# Patient Record
Sex: FEMALE | Race: BLACK OR AFRICAN AMERICAN | Employment: PART TIME
[De-identification: names, ages, dates, MRNs, and addresses within clinical notes are randomized per-mention and may not be internally consistent; named-entity substitution may affect disease eponyms.]

---

## 2023-09-25 ENCOUNTER — HOSPITAL ENCOUNTER (OUTPATIENT)
Facility: HOSPITAL | Age: 23
Discharge: HOME | End: 2023-09-28
Payer: MEDICAID

## 2023-09-25 VITALS — WEIGHT: 160 LBS

## 2023-09-25 DIAGNOSIS — H47.11 PAPILLEDEMA ASSOCIATED WITH INCREASED INTRACRANIAL PRESSURE: ICD-10-CM

## 2023-09-25 PROCEDURE — 70553 MRI BRAIN STEM W/O & W/DYE: CPT

## 2023-09-25 PROCEDURE — 70544 MR ANGIOGRAPHY HEAD W/O DYE: CPT

## 2023-09-25 PROCEDURE — A9579 GAD-BASE MR CONTRAST NOS,1ML: HCPCS | Performed by: RADIOLOGY

## 2023-09-25 PROCEDURE — 6360000004 HC RX CONTRAST MEDICATION: Performed by: RADIOLOGY

## 2023-09-25 RX ADMIN — GADOTERIDOL 14 ML: 279.3 INJECTION, SOLUTION INTRAVENOUS at 10:51

## 2024-01-05 VITALS
RESPIRATION RATE: 17 BRPM | HEART RATE: 62 BPM | TEMPERATURE: 97.9 F | WEIGHT: 173.5 LBS | SYSTOLIC BLOOD PRESSURE: 155 MMHG | DIASTOLIC BLOOD PRESSURE: 100 MMHG | OXYGEN SATURATION: 99 %

## 2024-01-05 PROCEDURE — 96372 THER/PROPH/DIAG INJ SC/IM: CPT

## 2024-01-05 PROCEDURE — 99284 EMERGENCY DEPT VISIT MOD MDM: CPT

## 2024-01-06 ENCOUNTER — APPOINTMENT (OUTPATIENT)
Facility: HOSPITAL | Age: 24
End: 2024-01-06
Payer: MEDICAID

## 2024-01-06 ENCOUNTER — HOSPITAL ENCOUNTER (EMERGENCY)
Facility: HOSPITAL | Age: 24
Discharge: HOME | End: 2024-01-06
Payer: MEDICAID

## 2024-01-06 DIAGNOSIS — R51.9 NONINTRACTABLE HEADACHE, UNSPECIFIED CHRONICITY PATTERN, UNSPECIFIED HEADACHE TYPE: Primary | ICD-10-CM

## 2024-01-06 LAB
BASOPHILS # BLD: 0.1 K/UL (ref 0–0.1)
BASOPHILS NFR BLD: 1 % (ref 0–1)
DIFFERENTIAL METHOD BLD: ABNORMAL
EOSINOPHIL # BLD: 0.1 K/UL (ref 0–0.4)
EOSINOPHIL NFR BLD: 2 % (ref 0–7)
ERYTHROCYTE [DISTWIDTH] IN BLOOD BY AUTOMATED COUNT: 12.8 % (ref 11.5–14.5)
HCG UR QL: NEGATIVE
HCT VFR BLD AUTO: 35.7 % (ref 35–47)
HGB BLD-MCNC: 11.7 G/DL (ref 11.5–16)
IMM GRANULOCYTES # BLD AUTO: 0 K/UL (ref 0–0.04)
IMM GRANULOCYTES NFR BLD AUTO: 0 % (ref 0–0.5)
LYMPHOCYTES # BLD: 3.3 K/UL (ref 0.8–3.5)
LYMPHOCYTES NFR BLD: 47 % (ref 12–49)
MCH RBC QN AUTO: 32.1 PG (ref 26–34)
MCHC RBC AUTO-ENTMCNC: 32.8 G/DL (ref 30–36.5)
MCV RBC AUTO: 98.1 FL (ref 80–99)
MONOCYTES # BLD: 0.5 K/UL (ref 0–1)
MONOCYTES NFR BLD: 7 % (ref 5–13)
NEUTS SEG # BLD: 3.1 K/UL (ref 1.8–8)
NEUTS SEG NFR BLD: 43 % (ref 32–75)
NRBC # BLD: 0 K/UL (ref 0–0.01)
NRBC BLD-RTO: 0 PER 100 WBC
PLATELET # BLD AUTO: 517 K/UL (ref 150–400)
PMV BLD AUTO: 9.1 FL (ref 8.9–12.9)
RBC # BLD AUTO: 3.64 M/UL (ref 3.8–5.2)
WBC # BLD AUTO: 7.1 K/UL (ref 3.6–11)

## 2024-01-06 PROCEDURE — 36415 COLL VENOUS BLD VENIPUNCTURE: CPT

## 2024-01-06 PROCEDURE — 6370000000 HC RX 637 (ALT 250 FOR IP): Performed by: STUDENT IN AN ORGANIZED HEALTH CARE EDUCATION/TRAINING PROGRAM

## 2024-01-06 PROCEDURE — 85025 COMPLETE CBC W/AUTO DIFF WBC: CPT

## 2024-01-06 PROCEDURE — 96372 THER/PROPH/DIAG INJ SC/IM: CPT

## 2024-01-06 PROCEDURE — 6360000002 HC RX W HCPCS: Performed by: STUDENT IN AN ORGANIZED HEALTH CARE EDUCATION/TRAINING PROGRAM

## 2024-01-06 PROCEDURE — 81025 URINE PREGNANCY TEST: CPT

## 2024-01-06 PROCEDURE — 70450 CT HEAD/BRAIN W/O DYE: CPT

## 2024-01-06 PROCEDURE — 93005 ELECTROCARDIOGRAM TRACING: CPT | Performed by: STUDENT IN AN ORGANIZED HEALTH CARE EDUCATION/TRAINING PROGRAM

## 2024-01-06 RX ORDER — ONDANSETRON 4 MG/1
4 TABLET, ORALLY DISINTEGRATING ORAL ONCE
Status: COMPLETED | OUTPATIENT
Start: 2024-01-06 | End: 2024-01-06

## 2024-01-06 RX ORDER — ACETAMINOPHEN 500 MG
1000 TABLET ORAL
Status: COMPLETED | OUTPATIENT
Start: 2024-01-06 | End: 2024-01-06

## 2024-01-06 RX ORDER — KETOROLAC TROMETHAMINE 30 MG/ML
15 INJECTION, SOLUTION INTRAMUSCULAR; INTRAVENOUS
Status: COMPLETED | OUTPATIENT
Start: 2024-01-06 | End: 2024-01-06

## 2024-01-06 RX ORDER — 0.9 % SODIUM CHLORIDE 0.9 %
1000 INTRAVENOUS SOLUTION INTRAVENOUS ONCE
Status: DISCONTINUED | OUTPATIENT
Start: 2024-01-06 | End: 2024-01-06

## 2024-01-06 RX ORDER — KETOROLAC TROMETHAMINE 30 MG/ML
15 INJECTION, SOLUTION INTRAMUSCULAR; INTRAVENOUS
Status: DISCONTINUED | OUTPATIENT
Start: 2024-01-06 | End: 2024-01-06

## 2024-01-06 RX ORDER — ONDANSETRON 2 MG/ML
4 INJECTION INTRAMUSCULAR; INTRAVENOUS ONCE
Status: DISCONTINUED | OUTPATIENT
Start: 2024-01-06 | End: 2024-01-06

## 2024-01-06 RX ADMIN — ONDANSETRON 4 MG: 4 TABLET, ORALLY DISINTEGRATING ORAL at 02:54

## 2024-01-06 RX ADMIN — ACETAMINOPHEN 1000 MG: 500 TABLET ORAL at 02:42

## 2024-01-06 RX ADMIN — KETOROLAC TROMETHAMINE 15 MG: 30 INJECTION, SOLUTION INTRAMUSCULAR; INTRAVENOUS at 02:55

## 2024-01-06 ASSESSMENT — PAIN DESCRIPTION - LOCATION: LOCATION: HEAD

## 2024-01-06 ASSESSMENT — PAIN SCALES - GENERAL
PAINLEVEL_OUTOF10: 7

## 2024-01-06 ASSESSMENT — PAIN DESCRIPTION - FREQUENCY: FREQUENCY: CONTINUOUS

## 2024-01-06 ASSESSMENT — PAIN DESCRIPTION - DESCRIPTORS: DESCRIPTORS: SHARP

## 2024-01-06 NOTE — ED NOTES
Incorrect triage story and no vitals entered by previous RN.  I called patient into triage room at this time to record correct story and vital signs.

## 2024-01-06 NOTE — ED PROVIDER NOTES
unspecified chronicity pattern, unspecified headache type          DISPOSITION/PLAN   DISPOSITION Decision To Discharge 01/06/2024 03:44:30 AM      Discharge Note: The patient is stable for discharge home. The signs, symptoms, diagnosis, and discharge instructions have been discussed, understanding conveyed, and agreed upon. The patient is to follow up as recommended or return to ER should their symptoms worsen.      PATIENT REFERRED TO:  [facility] EMERGENCY DEPT  [address] [facility] [address] [phone]  Go to   As needed, If symptoms worsen    Lulu Stone PA  [address] [facility] [address] [phone]    Go to   As needed, If symptoms worsen         DISCHARGE MEDICATIONS:     Medication List      You have not been prescribed any medications.           DISCONTINUED MEDICATIONS:  There are no discharge medications for this patient.      I am the Primary Clinician of Record.   Snady Meza MD (electronically signed)      (Please note that parts of this dictation were completed with voice recognition software. Quite often unanticipated grammatical, syntax, homophones, and other interpretive errors are inadvertently transcribed by the computer software. Please disregards these errors. Please excuse any errors that have escaped final proofreading.)

## 2024-01-06 NOTE — DISCHARGE INSTRUCTIONS
You were seen in the emergency department for headache.  We performed a CT scan of your head which was normal.  You were given medications for pain.  You may take 4000 mg, or 4 g, of Tylenol daily-this is 2 tablets of the 500 mg dose 4 times daily.  You may also take ibuprofen.  Please follow-up with your scheduled neurology appointment.  Please return to the emergency department if you develop any difficulty seeing, weakness on one side of your body, slurred speech, worsening headache that does not go away with Tylenol or Motrin, headache with fever, headache with vomiting and unable to keep down liquids, or for any other symptoms that are concerning to you.

## 2024-01-07 LAB
EKG ATRIAL RATE: 80 BPM
EKG DIAGNOSIS: NORMAL
EKG P AXIS: 60 DEGREES
EKG P-R INTERVAL: 144 MS
EKG Q-T INTERVAL: 394 MS
EKG QRS DURATION: 72 MS
EKG QTC CALCULATION (BAZETT): 454 MS
EKG R AXIS: 74 DEGREES
EKG T AXIS: 37 DEGREES
EKG VENTRICULAR RATE: 80 BPM

## 2024-01-19 ENCOUNTER — ANCILLARY ORDERS (OUTPATIENT)
Age: 24
End: 2024-01-19

## 2024-01-19 ENCOUNTER — TELEPHONE (OUTPATIENT)
Age: 24
End: 2024-01-19

## 2024-01-19 ENCOUNTER — OFFICE VISIT (OUTPATIENT)
Age: 24
End: 2024-01-19
Payer: MEDICAID

## 2024-01-19 VITALS
OXYGEN SATURATION: 99 % | SYSTOLIC BLOOD PRESSURE: 122 MMHG | HEIGHT: 60 IN | WEIGHT: 174 LBS | BODY MASS INDEX: 34.16 KG/M2 | RESPIRATION RATE: 16 BRPM | DIASTOLIC BLOOD PRESSURE: 72 MMHG | HEART RATE: 90 BPM

## 2024-01-19 DIAGNOSIS — G93.2 IIH (IDIOPATHIC INTRACRANIAL HYPERTENSION): Primary | ICD-10-CM

## 2024-01-19 DIAGNOSIS — G93.2 IIH (IDIOPATHIC INTRACRANIAL HYPERTENSION): ICD-10-CM

## 2024-01-19 PROCEDURE — 99204 OFFICE O/P NEW MOD 45 MIN: CPT | Performed by: PSYCHIATRY & NEUROLOGY

## 2024-01-19 RX ORDER — TOPIRAMATE 50 MG/1
TABLET, FILM COATED ORAL
Qty: 60 TABLET | Refills: 1 | Status: SHIPPED | OUTPATIENT
Start: 2024-01-19

## 2024-01-19 RX ORDER — IBUPROFEN 800 MG/1
TABLET ORAL
COMMUNITY
Start: 2024-01-08

## 2024-01-19 ASSESSMENT — PATIENT HEALTH QUESTIONNAIRE - PHQ9
SUM OF ALL RESPONSES TO PHQ QUESTIONS 1-9: 1
SUM OF ALL RESPONSES TO PHQ QUESTIONS 1-9: 1
2. FEELING DOWN, DEPRESSED OR HOPELESS: 1
SUM OF ALL RESPONSES TO PHQ QUESTIONS 1-9: 1
SUM OF ALL RESPONSES TO PHQ9 QUESTIONS 1 & 2: 1
SUM OF ALL RESPONSES TO PHQ QUESTIONS 1-9: 1
1. LITTLE INTEREST OR PLEASURE IN DOING THINGS: 0

## 2024-01-19 NOTE — PROGRESS NOTES
fasiculations noted.      Reflexes:  DTRs 2+ throughout.  Toes downgoing.        LABS / IMAGING  MRI Result (most recent):  MRI BRAIN W WO CONTRAST 09/25/2023    Narrative  EXAM:  MRV HEAD WO CONTRAST, MRI BRAIN W WO CONTRAST    INDICATION: 23-year-old female with Papilledema associated with increased  intracranial pressure.    COMPARISON:  None.    CONTRAST:  14 mL of ProHance.    TECHNIQUE:  Multiplanar multisequence acquisition of the brain with and without IV contrast  administration. Additional sequences with particular focus on the orbits were  also acquired.  3-D time-of-flight noncontrast MRV of the brain was performed without IV  contrast administration. Multiplanar and MIP reconstructions were obtained.      FINDINGS:  MRI  No apparent parenchyma signal abnormality.  There is no acute infarct,  hemorrhage, extra-axial fluid collection, or mass effect. The ventricles are  normal in size and position.  There is no cerebellar tonsillar herniation.  Expected arterial flow-voids are present. Basal CSF cisterns are patent.  No  apparent mass or enhancing lesion.    Bilateral intraorbital optic nerves perineural sheath fluid distention with  associated flattening of the optic cups, compatible with reported papilledema.  No associated mass/intraorbital or enhancing lesion. The superior ophthalmic  veins are patent and grossly normal bilaterally. Intracanalicular and cisterna  segments and optic chiasm are grossly within normal limits. No sellar/parasellar  mass lesion. Pituitary gland configuration is within normal limits. Cavernous  sinuses within normal limits bilaterally.    Mild diffuse mucosal thickening of the paranasal sinuses. The mastoids are free  of fluid bilaterally.  No significant osseous or scalp lesions are identified.      MRV:  There is no evidence of dural venous sinus occlusion. At least moderate to  severe stenoses of proximal bilateral transverse sinuses, indeterminate.    No apparent

## 2024-01-19 NOTE — TELEPHONE ENCOUNTER
Called central scheduling to confirm the spinal puncture order. PSR called radiology and stated the order should be under \"FL Lumbar Puncture.\" Will reorder.

## 2024-02-05 ENCOUNTER — TELEPHONE (OUTPATIENT)
Age: 24
End: 2024-02-05

## 2024-02-05 ENCOUNTER — HOSPITAL ENCOUNTER (OUTPATIENT)
Facility: HOSPITAL | Age: 24
Discharge: HOME | End: 2024-02-08
Attending: PSYCHIATRY & NEUROLOGY
Payer: MEDICAID

## 2024-02-05 DIAGNOSIS — G93.2 IIH (IDIOPATHIC INTRACRANIAL HYPERTENSION): ICD-10-CM

## 2024-02-05 LAB — HCG UR QL: POSITIVE

## 2024-02-05 PROCEDURE — 81025 URINE PREGNANCY TEST: CPT

## 2024-02-05 NOTE — PROGRESS NOTES
Pt stated she \"has not had a period this month\". Routine POC urine pregnancy was run by Amy Sneed RN. POC test was POSITIVE. Verified by 2 RNs.  Provider was notified.  Ira Au NP. This RN notified pt and pt stated \"I knew it was possible\". Provider cancelled case and pt will be seen by OB/GYN and neurology for updated plan of care. Pt verbalized understanding. Pt ambulated off unit with steady gait noted.

## 2024-02-05 NOTE — RESULT ENCOUNTER NOTE
Yaya Lake MD Javed, Jihan I, MILI  Please inform patient that since her pregnancy test is positive.  She should discontinue topiramate, if she has not already done so.     Patient called, verified and notified. Stated her last dose of Topirmate \" was a week ago.\"

## 2024-02-05 NOTE — TELEPHONE ENCOUNTER
Patient called, verified and given positive pregnancy test results. She states \" I cannot take the medicine and they did not let me do the lumbar puncture today so what do I do?\"

## 2024-02-06 NOTE — TELEPHONE ENCOUNTER
Patient would like a call back from the nurse. Patient states she missed a call from the nurse today.

## 2024-02-07 ENCOUNTER — HOSPITAL ENCOUNTER (EMERGENCY)
Facility: HOSPITAL | Age: 24
Discharge: HOME | End: 2024-02-07
Attending: STUDENT IN AN ORGANIZED HEALTH CARE EDUCATION/TRAINING PROGRAM
Payer: MEDICAID

## 2024-02-07 ENCOUNTER — APPOINTMENT (OUTPATIENT)
Facility: HOSPITAL | Age: 24
End: 2024-02-07
Payer: MEDICAID

## 2024-02-07 VITALS
WEIGHT: 180.12 LBS | OXYGEN SATURATION: 100 % | SYSTOLIC BLOOD PRESSURE: 108 MMHG | RESPIRATION RATE: 18 BRPM | DIASTOLIC BLOOD PRESSURE: 68 MMHG | BODY MASS INDEX: 35.18 KG/M2 | HEART RATE: 62 BPM | TEMPERATURE: 98.2 F

## 2024-02-07 DIAGNOSIS — N30.01 ACUTE CYSTITIS WITH HEMATURIA: ICD-10-CM

## 2024-02-07 DIAGNOSIS — O46.90 VAGINAL BLEEDING IN PREGNANCY: Primary | ICD-10-CM

## 2024-02-07 LAB
ABO + RH BLD: NORMAL
ALBUMIN SERPL-MCNC: 3.7 G/DL (ref 3.5–5)
ALBUMIN/GLOB SERPL: 1 (ref 1.1–2.2)
ALP SERPL-CCNC: 48 U/L (ref 45–117)
ALT SERPL-CCNC: 16 U/L (ref 12–78)
ANION GAP SERPL CALC-SCNC: 3 MMOL/L (ref 5–15)
APPEARANCE UR: ABNORMAL
AST SERPL-CCNC: 12 U/L (ref 15–37)
BACTERIA URNS QL MICRO: ABNORMAL /HPF
BASOPHILS # BLD: 0.1 K/UL (ref 0–0.1)
BASOPHILS NFR BLD: 1 % (ref 0–1)
BILIRUB SERPL-MCNC: 0.3 MG/DL (ref 0.2–1)
BILIRUB UR QL: NEGATIVE
BLOOD BANK CMNT PATIENT-IMP: NORMAL
BUN SERPL-MCNC: 9 MG/DL (ref 6–20)
BUN/CREAT SERPL: 12 (ref 12–20)
CALCIUM SERPL-MCNC: 9 MG/DL (ref 8.5–10.1)
CHLORIDE SERPL-SCNC: 110 MMOL/L (ref 97–108)
CO2 SERPL-SCNC: 26 MMOL/L (ref 21–32)
COLOR UR: ABNORMAL
COMMENT:: NORMAL
CREAT SERPL-MCNC: 0.76 MG/DL (ref 0.55–1.02)
DIFFERENTIAL METHOD BLD: ABNORMAL
EOSINOPHIL # BLD: 0 K/UL (ref 0–0.4)
EOSINOPHIL NFR BLD: 1 % (ref 0–7)
EPITH CASTS URNS QL MICRO: ABNORMAL /LPF
ERYTHROCYTE [DISTWIDTH] IN BLOOD BY AUTOMATED COUNT: 13 % (ref 11.5–14.5)
GLOBULIN SER CALC-MCNC: 3.8 G/DL (ref 2–4)
GLUCOSE SERPL-MCNC: 76 MG/DL (ref 65–100)
GLUCOSE UR STRIP.AUTO-MCNC: NEGATIVE MG/DL
HCG SERPL-ACNC: 2923 MIU/ML (ref 0–6)
HCT VFR BLD AUTO: 36.8 % (ref 35–47)
HGB BLD-MCNC: 11.9 G/DL (ref 11.5–16)
HGB UR QL STRIP: NEGATIVE
HYALINE CASTS URNS QL MICRO: ABNORMAL /LPF (ref 0–5)
IMM GRANULOCYTES # BLD AUTO: 0 K/UL (ref 0–0.04)
IMM GRANULOCYTES NFR BLD AUTO: 0 % (ref 0–0.5)
KETONES UR QL STRIP.AUTO: NEGATIVE MG/DL
LEUKOCYTE ESTERASE UR QL STRIP.AUTO: ABNORMAL
LYMPHOCYTES # BLD: 3 K/UL (ref 0.8–3.5)
LYMPHOCYTES NFR BLD: 34 % (ref 12–49)
MCH RBC QN AUTO: 31.7 PG (ref 26–34)
MCHC RBC AUTO-ENTMCNC: 32.3 G/DL (ref 30–36.5)
MCV RBC AUTO: 98.1 FL (ref 80–99)
MONOCYTES # BLD: 0.6 K/UL (ref 0–1)
MONOCYTES NFR BLD: 7 % (ref 5–13)
NEUTS SEG # BLD: 5.2 K/UL (ref 1.8–8)
NEUTS SEG NFR BLD: 57 % (ref 32–75)
NITRITE UR QL STRIP.AUTO: NEGATIVE
NRBC # BLD: 0 K/UL (ref 0–0.01)
NRBC BLD-RTO: 0 PER 100 WBC
PH UR STRIP: 7.5 (ref 5–8)
PLATELET # BLD AUTO: 442 K/UL (ref 150–400)
PMV BLD AUTO: 8.9 FL (ref 8.9–12.9)
POTASSIUM SERPL-SCNC: 3.8 MMOL/L (ref 3.5–5.1)
PROT SERPL-MCNC: 7.5 G/DL (ref 6.4–8.2)
PROT UR STRIP-MCNC: NEGATIVE MG/DL
RBC # BLD AUTO: 3.75 M/UL (ref 3.8–5.2)
RBC #/AREA URNS HPF: ABNORMAL /HPF (ref 0–5)
SAC DIAMETER: 0.44 CM
SAC DIAMETER: 0.44 CM
SAC DIAMETER: 0.45 CM
SODIUM SERPL-SCNC: 139 MMOL/L (ref 136–145)
SP GR UR REFRACTOMETRY: 1.03 (ref 1–1.03)
SPECIMEN HOLD: NORMAL
SPECIMEN HOLD: NORMAL
UROBILINOGEN UR QL STRIP.AUTO: 1 EU/DL (ref 0.2–1)
WBC # BLD AUTO: 8.8 K/UL (ref 3.6–11)
WBC URNS QL MICRO: ABNORMAL /HPF (ref 0–4)

## 2024-02-07 PROCEDURE — 86901 BLOOD TYPING SEROLOGIC RH(D): CPT

## 2024-02-07 PROCEDURE — 85025 COMPLETE CBC W/AUTO DIFF WBC: CPT

## 2024-02-07 PROCEDURE — 99284 EMERGENCY DEPT VISIT MOD MDM: CPT

## 2024-02-07 PROCEDURE — 36415 COLL VENOUS BLD VENIPUNCTURE: CPT

## 2024-02-07 PROCEDURE — 76817 TRANSVAGINAL US OBSTETRIC: CPT

## 2024-02-07 PROCEDURE — 81001 URINALYSIS AUTO W/SCOPE: CPT

## 2024-02-07 PROCEDURE — 84702 CHORIONIC GONADOTROPIN TEST: CPT

## 2024-02-07 PROCEDURE — 86900 BLOOD TYPING SEROLOGIC ABO: CPT

## 2024-02-07 PROCEDURE — 76801 OB US < 14 WKS SINGLE FETUS: CPT

## 2024-02-07 PROCEDURE — 80053 COMPREHEN METABOLIC PANEL: CPT

## 2024-02-07 RX ORDER — CEPHALEXIN 500 MG/1
500 CAPSULE ORAL 2 TIMES DAILY
Qty: 14 CAPSULE | Refills: 0 | Status: SHIPPED | OUTPATIENT
Start: 2024-02-07 | End: 2024-02-14

## 2024-02-07 ASSESSMENT — ENCOUNTER SYMPTOMS
COUGH: 0
EYE PAIN: 0
NAUSEA: 0
DIARRHEA: 0
SORE THROAT: 0
ABDOMINAL PAIN: 0
VOMITING: 0
SHORTNESS OF BREATH: 0

## 2024-02-07 ASSESSMENT — PAIN SCALES - GENERAL: PAINLEVEL_OUTOF10: 0

## 2024-02-07 NOTE — ED PROVIDER NOTES
Platelets 442 (*)     All other components within normal limits   COMPREHENSIVE METABOLIC PANEL - Abnormal; Notable for the following components:    Chloride 110 (*)     Anion Gap 3 (*)     AST 12 (*)     Albumin/Globulin Ratio 1.0 (*)     All other components within normal limits   HCG, QUANTITATIVE, PREGNANCY - Abnormal; Notable for the following components:    hCG Quant 2,923 (*)     All other components within normal limits   URINE CULTURE HOLD SAMPLE   EXTRA TUBES HOLD   EXTRA TUBE, BLOOD BANK   ABO/RH       All other labs were within normal range or not returned as of this dictation.    EMERGENCY DEPARTMENT COURSE and DIFFERENTIAL DIAGNOSIS/MDM:   Vitals:    Vitals:    24 1133 24 1415   BP: (!) 143/78 108/68   Pulse: 91 62   Resp: 16 18   Temp: 98.5 °F (36.9 °C) 98.2 °F (36.8 °C)   TempSrc: Oral Oral   SpO2: 100% 100%   Weight: 81.7 kg (180 lb 1.9 oz)            Medical Decision Making  23 y.o.  female presents to ED with vaginal bleeding, pelvic pain and leg swelling.  Vital signs stable in triage and patient afebrile.  Physical exam unremarkable with heart sounds normal, lungs clear to auscultation bilaterally.  No abdominal tenderness to palpation and no leg swelling per my personal physical exam.  Labs unremarkable with beta-hCG around 2900 but no elevation white blood cell count no electrolyte abnormalities.  Did note slight indication of urinary tract infection with leukocyte Estrace and bacteria in urine.  Will treat as such.  Ultrasound findings as outlined below. Results and findings have been communicated and explained thoroughly to patient and family members. Patient has been educated on strict return precautions as well as instructions for conservative care and follow-up. Patient will be discharged with prescription for keflex and has been given opportunity to ask questions about this new medication.Patient verbalizes understanding and no socioeconomic barriers to care were

## 2024-02-07 NOTE — DISCHARGE INSTRUCTIONS
Continue to monitor symptoms at home.  Take Tylenol as needed for pain.  Follow-up with PCP and return with any changes or worsening.

## 2024-02-07 NOTE — ED TRIAGE NOTES
Pt c/o bilateral leg swelling, pt stated she is pregnant and is having vaginal bleeding that started today , LMP Feb, denies cp or sob

## 2024-02-07 NOTE — TELEPHONE ENCOUNTER
M for patient to call back and discuss Dr. Lake's recommendations.     \"She does need a lumbar puncture for diagnostic and therapeutic purposes.  If headaches have not remitting, I would recommend going to the ER to get an LP.  I would avoid medications at least for the first few weeks of the first trimester.\"    Yaya Lake MD.

## 2024-02-17 ENCOUNTER — HOSPITAL ENCOUNTER (EMERGENCY)
Facility: HOSPITAL | Age: 24
Discharge: HOME | End: 2024-02-17
Attending: STUDENT IN AN ORGANIZED HEALTH CARE EDUCATION/TRAINING PROGRAM
Payer: MEDICAID

## 2024-02-17 VITALS
TEMPERATURE: 98 F | WEIGHT: 196.65 LBS | HEART RATE: 75 BPM | HEIGHT: 60 IN | OXYGEN SATURATION: 98 % | RESPIRATION RATE: 12 BRPM | SYSTOLIC BLOOD PRESSURE: 119 MMHG | DIASTOLIC BLOOD PRESSURE: 76 MMHG | BODY MASS INDEX: 38.61 KG/M2

## 2024-02-17 DIAGNOSIS — B37.31 YEAST VAGINITIS: Primary | ICD-10-CM

## 2024-02-17 LAB
APPEARANCE UR: CLEAR
BACTERIA URNS QL MICRO: ABNORMAL /HPF
BILIRUB UR QL: NEGATIVE
COLOR UR: ABNORMAL
COMMENT:: NORMAL
EPITH CASTS URNS QL MICRO: ABNORMAL /LPF
GLUCOSE UR STRIP.AUTO-MCNC: NEGATIVE MG/DL
HCG UR QL: POSITIVE
HGB UR QL STRIP: NEGATIVE
KETONES UR QL STRIP.AUTO: NEGATIVE MG/DL
LEUKOCYTE ESTERASE UR QL STRIP.AUTO: NEGATIVE
NITRITE UR QL STRIP.AUTO: NEGATIVE
PH UR STRIP: 6 (ref 5–8)
PROT UR STRIP-MCNC: NEGATIVE MG/DL
RBC #/AREA URNS HPF: ABNORMAL /HPF (ref 0–5)
SP GR UR REFRACTOMETRY: 1.02 (ref 1–1.03)
SPECIMEN HOLD: NORMAL
UROBILINOGEN UR QL STRIP.AUTO: 0.2 EU/DL (ref 0.2–1)
WBC URNS QL MICRO: ABNORMAL /HPF (ref 0–4)

## 2024-02-17 PROCEDURE — 99283 EMERGENCY DEPT VISIT LOW MDM: CPT

## 2024-02-17 PROCEDURE — 81001 URINALYSIS AUTO W/SCOPE: CPT

## 2024-02-17 PROCEDURE — 81025 URINE PREGNANCY TEST: CPT

## 2024-02-17 ASSESSMENT — PAIN - FUNCTIONAL ASSESSMENT: PAIN_FUNCTIONAL_ASSESSMENT: 0-10

## 2024-02-17 ASSESSMENT — PAIN DESCRIPTION - LOCATION: LOCATION: VAGINA

## 2024-02-17 ASSESSMENT — PAIN SCALES - GENERAL: PAINLEVEL_OUTOF10: 5

## 2024-02-17 ASSESSMENT — PAIN DESCRIPTION - DESCRIPTORS: DESCRIPTORS: BURNING

## 2024-02-17 NOTE — ED TRIAGE NOTES
Pt arrives from home for concern of yeast infection. Pt is pregnant, does not know how far along she is. Also c/o blood from rectum.

## 2024-02-17 NOTE — ED PROVIDER NOTES
[facility] EMERGENCY DEP  EMERGENCY DEPARTMENT ENCOUNTER      Pt Name: Ina Connor  MRN: [id number]  Birthdate 2000  Date of evaluation: 2024  Provider: MONTY Magallanes NP    CHIEF COMPLAINT       Chief Complaint   Patient presents with    Vaginal Itching         HISTORY OF PRESENT ILLNESS   (Location/Symptom, Timing/Onset, Context/Setting, Quality, Duration, Modifying Factors, Severity)  Note limiting factors.   HPI  Patient is a 23-year-old pregnant female who presents to the ED with vaginal area itching and concern for yeast infection.(A0). LMP 2024.  She denies any fever, abdominal pain, pelvic pain or back pain.  Denies any nausea vomiting, urinary symptoms constipation or diarrhea.  She has not had any medications today prior to arrival.  Old chart reviewed.    Review of External Medical Records:     Nursing Notes were reviewed.    REVIEW OF SYSTEMS    (2-9 systems for level 4, 10 or more for level 5)     Review of Systems   Constitutional:  Negative for activity change, appetite change, fever and unexpected weight change.   HENT:  Negative for congestion and trouble swallowing.    Eyes:  Negative for visual disturbance.   Respiratory:  Negative for cough and shortness of breath.    Cardiovascular:  Negative for chest pain, palpitations and leg swelling.   Gastrointestinal:  Negative for abdominal pain, diarrhea, nausea and vomiting.   Genitourinary:  Negative for dysuria, pelvic pain, vaginal bleeding and vaginal discharge.   Musculoskeletal:  Negative for back pain, gait problem and myalgias.   Skin:  Negative for rash.   Neurological:  Negative for headaches.   All other systems reviewed and are negative.      Except as noted above the remainder of the review of systems was reviewed and negative.       PAST MEDICAL HISTORY   No past medical history on file.      SURGICAL HISTORY     No past surgical history on file.      CURRENT MEDICATIONS       Discharge Medication List as of  agreement of her signs, symptoms, diagnosis, treatment and prognosis and additionally agrees to follow up as recommended or return to the Emergency Room should her condition change prior to follow-up.  Discharge instructions have also been provided to the patient with some educational information regarding her diagnosis as well a list of reasons why she would want to return to the ER prior to her follow-up appointment should her condition change.        CONSULTS:  None    PROCEDURES:  Unless otherwise noted below, none     Procedures      FINAL IMPRESSION      1. Yeast vaginitis          DISPOSITION/PLAN   DISPOSITION Decision To Discharge 02/17/2024 03:12:49 PM      PATIENT REFERRED TO:  Raphael Gill MD  [address] [facility]  [address]  [facility] [address] [phone]    In 3 days  As needed, If symptoms worsen    Lulu Stone PA  [address] [facility] [address] [phone]      As needed, If symptoms worsen      DISCHARGE MEDICATIONS:  Discharge Medication List as of 2/17/2024  3:22 PM        START taking these medications    Details   miconazole (MICONAZOLE 7) 2 % vaginal cream Place vaginally nightly., Disp-45 g, R-1Normal               (Please note that portions of this note were completed with a voice recognition program.  Efforts were made to edit the dictations but occasionally words are mis-transcribed.)    MONTY Magallanes NP (electronically signed)  Emergency Attending Physician / Physician Assistant / Nurse Practitioner             Jannet He APRN - NP  02/17/24 3146

## 2024-02-26 ENCOUNTER — HOSPITAL ENCOUNTER (EMERGENCY)
Facility: HOSPITAL | Age: 24
Discharge: HOME | End: 2024-02-27

## 2024-02-26 VITALS
HEART RATE: 75 BPM | RESPIRATION RATE: 17 BRPM | TEMPERATURE: 98 F | BODY MASS INDEX: 35.95 KG/M2 | SYSTOLIC BLOOD PRESSURE: 124 MMHG | OXYGEN SATURATION: 99 % | DIASTOLIC BLOOD PRESSURE: 81 MMHG | WEIGHT: 184.08 LBS

## 2024-02-26 ASSESSMENT — PAIN SCALES - GENERAL: PAINLEVEL_OUTOF10: 10

## 2024-02-26 ASSESSMENT — PAIN - FUNCTIONAL ASSESSMENT: PAIN_FUNCTIONAL_ASSESSMENT: 0-10

## 2024-02-26 ASSESSMENT — PAIN DESCRIPTION - LOCATION: LOCATION: HEAD

## 2024-02-27 NOTE — ED TRIAGE NOTES
Pt comes in from home with CC of a bad migraine that has been going on for a few nights, but got worse tonight. Pt is 8 weeks pregnant. Pt saw OB today.     HX of ICP.

## 2024-02-29 ENCOUNTER — HOSPITAL ENCOUNTER (EMERGENCY)
Facility: HOSPITAL | Age: 24
Discharge: HOME | End: 2024-02-29
Attending: STUDENT IN AN ORGANIZED HEALTH CARE EDUCATION/TRAINING PROGRAM
Payer: MEDICAID

## 2024-02-29 VITALS
HEART RATE: 71 BPM | DIASTOLIC BLOOD PRESSURE: 88 MMHG | SYSTOLIC BLOOD PRESSURE: 131 MMHG | RESPIRATION RATE: 18 BRPM | TEMPERATURE: 97.7 F | OXYGEN SATURATION: 100 %

## 2024-02-29 DIAGNOSIS — R51.9 CHRONIC NONINTRACTABLE HEADACHE, UNSPECIFIED HEADACHE TYPE: Primary | ICD-10-CM

## 2024-02-29 DIAGNOSIS — G89.29 CHRONIC NONINTRACTABLE HEADACHE, UNSPECIFIED HEADACHE TYPE: Primary | ICD-10-CM

## 2024-02-29 LAB
ANION GAP SERPL CALC-SCNC: 2 MMOL/L (ref 5–15)
BASOPHILS # BLD: 0.1 K/UL (ref 0–0.1)
BASOPHILS NFR BLD: 1 % (ref 0–1)
BUN SERPL-MCNC: 7 MG/DL (ref 6–20)
BUN/CREAT SERPL: 10 (ref 12–20)
CHLORIDE SERPL-SCNC: 107 MMOL/L (ref 97–108)
CO2 SERPL-SCNC: 24 MMOL/L (ref 21–32)
COMMENT:: NORMAL
CREAT SERPL-MCNC: 0.72 MG/DL (ref 0.55–1.02)
DIFFERENTIAL METHOD BLD: ABNORMAL
EOSINOPHIL # BLD: 0.1 K/UL (ref 0–0.4)
EOSINOPHIL NFR BLD: 1 % (ref 0–7)
ERYTHROCYTE [DISTWIDTH] IN BLOOD BY AUTOMATED COUNT: 13.1 % (ref 11.5–14.5)
GLUCOSE SERPL-MCNC: 95 MG/DL (ref 65–100)
HCT VFR BLD AUTO: 34.9 % (ref 35–47)
HGB BLD-MCNC: 12.2 G/DL (ref 11.5–16)
IMM GRANULOCYTES # BLD AUTO: 0 K/UL (ref 0–0.04)
IMM GRANULOCYTES NFR BLD AUTO: 0 % (ref 0–0.5)
LYMPHOCYTES # BLD: 2.8 K/UL (ref 0.8–3.5)
LYMPHOCYTES NFR BLD: 34 % (ref 12–49)
MCH RBC QN AUTO: 32.4 PG (ref 26–34)
MCHC RBC AUTO-ENTMCNC: 35 G/DL (ref 30–36.5)
MCV RBC AUTO: 92.6 FL (ref 80–99)
MONOCYTES # BLD: 0.6 K/UL (ref 0–1)
MONOCYTES NFR BLD: 7 % (ref 5–13)
NEUTS SEG # BLD: 4.6 K/UL (ref 1.8–8)
NEUTS SEG NFR BLD: 57 % (ref 32–75)
NRBC # BLD: 0 K/UL (ref 0–0.01)
NRBC BLD-RTO: 0 PER 100 WBC
PLATELET # BLD AUTO: 343 K/UL (ref 150–400)
PMV BLD AUTO: 8.9 FL (ref 8.9–12.9)
POTASSIUM SERPL-SCNC: 3.5 MMOL/L (ref 3.5–5.1)
RBC # BLD AUTO: 3.77 M/UL (ref 3.8–5.2)
RBC MORPH BLD: ABNORMAL
SODIUM SERPL-SCNC: 133 MMOL/L (ref 136–145)
SPECIMEN HOLD: NORMAL
WBC # BLD AUTO: 8.2 K/UL (ref 3.6–11)

## 2024-02-29 PROCEDURE — 96375 TX/PRO/DX INJ NEW DRUG ADDON: CPT

## 2024-02-29 PROCEDURE — 36415 COLL VENOUS BLD VENIPUNCTURE: CPT

## 2024-02-29 PROCEDURE — 80048 BASIC METABOLIC PNL TOTAL CA: CPT

## 2024-02-29 PROCEDURE — 96361 HYDRATE IV INFUSION ADD-ON: CPT

## 2024-02-29 PROCEDURE — 6360000002 HC RX W HCPCS: Performed by: STUDENT IN AN ORGANIZED HEALTH CARE EDUCATION/TRAINING PROGRAM

## 2024-02-29 PROCEDURE — 99284 EMERGENCY DEPT VISIT MOD MDM: CPT

## 2024-02-29 PROCEDURE — 6370000000 HC RX 637 (ALT 250 FOR IP): Performed by: STUDENT IN AN ORGANIZED HEALTH CARE EDUCATION/TRAINING PROGRAM

## 2024-02-29 PROCEDURE — 96374 THER/PROPH/DIAG INJ IV PUSH: CPT

## 2024-02-29 PROCEDURE — 2580000003 HC RX 258: Performed by: STUDENT IN AN ORGANIZED HEALTH CARE EDUCATION/TRAINING PROGRAM

## 2024-02-29 PROCEDURE — 85025 COMPLETE CBC W/AUTO DIFF WBC: CPT

## 2024-02-29 RX ORDER — PROCHLORPERAZINE EDISYLATE 5 MG/ML
10 INJECTION INTRAMUSCULAR; INTRAVENOUS ONCE
Status: COMPLETED | OUTPATIENT
Start: 2024-02-29 | End: 2024-02-29

## 2024-02-29 RX ORDER — ACETAMINOPHEN 500 MG
1000 TABLET ORAL
Status: COMPLETED | OUTPATIENT
Start: 2024-02-29 | End: 2024-02-29

## 2024-02-29 RX ORDER — DIPHENHYDRAMINE HYDROCHLORIDE 50 MG/ML
25 INJECTION INTRAMUSCULAR; INTRAVENOUS
Status: COMPLETED | OUTPATIENT
Start: 2024-02-29 | End: 2024-02-29

## 2024-02-29 RX ORDER — 0.9 % SODIUM CHLORIDE 0.9 %
1000 INTRAVENOUS SOLUTION INTRAVENOUS ONCE
Status: COMPLETED | OUTPATIENT
Start: 2024-02-29 | End: 2024-02-29

## 2024-02-29 RX ORDER — PROCHLORPERAZINE MALEATE 10 MG
10 TABLET ORAL EVERY 8 HOURS PRN
Qty: 12 TABLET | Refills: 0 | Status: SHIPPED | OUTPATIENT
Start: 2024-02-29

## 2024-02-29 RX ADMIN — DIPHENHYDRAMINE HYDROCHLORIDE 25 MG: 50 INJECTION INTRAMUSCULAR; INTRAVENOUS at 03:18

## 2024-02-29 RX ADMIN — ACETAMINOPHEN 1000 MG: 500 TABLET ORAL at 03:16

## 2024-02-29 RX ADMIN — PROCHLORPERAZINE EDISYLATE 10 MG: 5 INJECTION INTRAMUSCULAR; INTRAVENOUS at 03:18

## 2024-02-29 RX ADMIN — SODIUM CHLORIDE 1000 ML: 9 INJECTION, SOLUTION INTRAVENOUS at 03:17

## 2024-02-29 ASSESSMENT — ENCOUNTER SYMPTOMS
COUGH: 0
EYE DISCHARGE: 0
EYE REDNESS: 0
SHORTNESS OF BREATH: 0
ABDOMINAL PAIN: 0
NAUSEA: 0
RHINORRHEA: 0
VOMITING: 0
DIARRHEA: 0

## 2024-02-29 ASSESSMENT — PAIN DESCRIPTION - LOCATION: LOCATION: HEAD

## 2024-02-29 ASSESSMENT — PAIN DESCRIPTION - ORIENTATION: ORIENTATION: RIGHT;LEFT;ANTERIOR;POSTERIOR

## 2024-02-29 ASSESSMENT — PAIN DESCRIPTION - FREQUENCY: FREQUENCY: CONTINUOUS

## 2024-02-29 ASSESSMENT — PAIN DESCRIPTION - DESCRIPTORS: DESCRIPTORS: DISCOMFORT

## 2024-02-29 ASSESSMENT — PAIN DESCRIPTION - PAIN TYPE: TYPE: ACUTE PAIN

## 2024-02-29 ASSESSMENT — PAIN - FUNCTIONAL ASSESSMENT
PAIN_FUNCTIONAL_ASSESSMENT: 0-10
PAIN_FUNCTIONAL_ASSESSMENT: ACTIVITIES ARE NOT PREVENTED

## 2024-02-29 ASSESSMENT — PAIN DESCRIPTION - ONSET: ONSET: PROGRESSIVE

## 2024-02-29 ASSESSMENT — PAIN SCALES - GENERAL: PAINLEVEL_OUTOF10: 4

## 2024-02-29 NOTE — ED PROVIDER NOTES
patient regarding further evaluation with lumbar puncture, discussed risks/benefits/alternatives.  Patient stating now that headache is resolved she does not want to proceed with LP and will follow-up as an outpatient.  Strict ER return precautions given. Patient and/or family verbally conveyed their understanding and agreement of the patient's signs, symptoms, diagnosis, treatment and prognosis and additionally agree to follow-up as recommended in the discharge instructions or to return to the Emergency Department should their condition change or worsen prior to their follow-up appointment.  All questions have been answered and patient and/or available family express understanding.        PROCEDURES:  Unless otherwise noted below, none     Procedures      FINAL IMPRESSION      1. Chronic nonintractable headache, unspecified headache type          DISPOSITION/PLAN   DISPOSITION Decision To Discharge 02/29/2024 04:06:49 AM      PATIENT REFERRED TO:  Lulu Stone PA  [address] [facility] [address] [phone]    Schedule an appointment as soon as possible for a visit       Yaya Lake MD  [address] [address] [phone]    Schedule an appointment as soon as possible for a visit       [facility] EMERGENCY DEP  [address] [facility] [id number] [phone]    If symptoms worsen      DISCHARGE MEDICATIONS:  New Prescriptions    PROCHLORPERAZINE (COMPAZINE) 10 MG TABLET    Take 1 tablet by mouth every 8 hours as needed (nausea or vomiting)         (Please note that portions of this note were completed with a voice recognition program.  Efforts were made to edit the dictations but occasionally words are mis-transcribed.)    Naomi Shultz DO (electronically signed)  Emergency Attending Physician / Physician Assistant / Nurse Practitioner         Naomi Shultz DO  02/29/24 8660

## 2024-02-29 NOTE — DISCHARGE INSTRUCTIONS
If headache returns, try Tylenol to see if this helps with your headache.  Please follow-up with your neurologist.  Return the emergency department for any new or worsening symptoms.

## 2024-02-29 NOTE — ED TRIAGE NOTES
Pt ambulatory to triage co headache. Pt describes the pain as pressure. Pt states she spoke to her neurologist who told patient to come to the ED to have an LP done. Pt is currently 8 week pregnant.     Pt states the pain is all over her head, and is radiating into her face. Denies photophobia, dizziness, n/v, sensitivity to noises, floaters.     Denies hx of migraines, but has hx of increased ICP.

## 2024-03-18 ENCOUNTER — TELEPHONE (OUTPATIENT)
Age: 24
End: 2024-03-18

## 2024-03-18 DIAGNOSIS — G93.2 IIH (IDIOPATHIC INTRACRANIAL HYPERTENSION): Primary | ICD-10-CM

## 2024-03-18 NOTE — TELEPHONE ENCOUNTER
Scheduling requesting doctor to update order code to IR instead of FL. For spinal puncture.    Please contact [phone]

## 2024-03-26 ENCOUNTER — HOSPITAL ENCOUNTER (OUTPATIENT)
Facility: HOSPITAL | Age: 24
Discharge: HOME | End: 2024-03-29
Attending: PSYCHIATRY & NEUROLOGY
Payer: MEDICAID

## 2024-03-26 DIAGNOSIS — G93.2 IIH (IDIOPATHIC INTRACRANIAL HYPERTENSION): ICD-10-CM

## 2024-03-26 LAB
APPEARANCE CSF: CLEAR
COLOR CSF: COLORLESS
COMMENT:: NORMAL
GLUCOSE CSF-MCNC: 59 MG/DL (ref 40–70)
PROT CSF-MCNC: 20 MG/DL (ref 15–45)
RBC # CSF: 17 /CU MM
SPECIMEN HOLD: NORMAL
TUBE # CSF: 1
TUBE # CSF: 1
TUBE # CSF: 3
WBC # CSF: 1 /CU MM (ref 0–5)

## 2024-03-26 PROCEDURE — 82945 GLUCOSE OTHER FLUID: CPT

## 2024-03-26 PROCEDURE — 84157 ASSAY OF PROTEIN OTHER: CPT

## 2024-03-26 PROCEDURE — 87070 CULTURE OTHR SPECIMN AEROBIC: CPT

## 2024-03-26 PROCEDURE — 89050 BODY FLUID CELL COUNT: CPT

## 2024-03-26 PROCEDURE — 87205 SMEAR GRAM STAIN: CPT

## 2024-03-26 PROCEDURE — 62328 DX LMBR SPI PNXR W/FLUOR/CT: CPT

## 2024-03-26 RX ORDER — SODIUM BICARBONATE 42 MG/ML
5 INJECTION, SOLUTION INTRAVENOUS ONCE
Status: DISCONTINUED | OUTPATIENT
Start: 2024-03-26 | End: 2024-03-30 | Stop reason: HOSPADM

## 2024-03-26 RX ORDER — LIDOCAINE HYDROCHLORIDE 10 MG/ML
5 INJECTION, SOLUTION EPIDURAL; INFILTRATION; INTRACAUDAL; PERINEURAL ONCE
Status: DISCONTINUED | OUTPATIENT
Start: 2024-03-26 | End: 2024-03-30 | Stop reason: HOSPADM

## 2024-03-26 NOTE — PROGRESS NOTES
Pt ambulated around nursing unit without incident. Pt denies pain, numbness, tingling or other discomforts. Pt was able to lift and rotate bilateral extremities and sit without pain. Pt bandaid was clean, dry and intact.  Pt discharge instructions were given to pt by RN. Opportunities for questions and concerns were provided and addressed. Pt verbalized understanding of medication use and follow-up. Pt wheeled off unit in no signs of distress, steady gait noted. Pt placed In private vehicle by RN.   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

## 2024-03-26 NOTE — DISCHARGE INSTRUCTIONS
LUMBAR PUNCTURE DISCHARGE INSTRUCTIONS        GENERAL INFORMATION:    A needle aspiration is removal of a small amount of tissue or fluid. The tissue or fluid is then examined in the laboratory.     INSTRUCTIONS:     Cover aspiration site with a sterile band aid.  Keep the area clean and dry until next shower, when you can discard the band aid.   Lie flat for next 12 hours post procedure.  You should be on a light activity schedule for the next 24-48 hours.  Drink extra fluids today and tomorrow.  You may take a non-aspirin pain reliever if necessary.        CALL YOUR REFERRING PHYSICIAN IF:     You have any sever redness or swelling, or temperature over 101°F.  You have any drainage of fluid from the lumbar site, especially if you have yellow or green discharge, or any bleeding more than a spot on the dressing.  You have any severe pain or bruising in the area for more than a few days.  You have worsening head or neck pain that lasts longer than 24 hours.   You have repeated vomiting.  You have reduced alertness or difficulty awakening.  You have dizziness or balance difficulties or visual blurring or disturbance.  You have light or noise sensitivity.  You have any increase in pain, weakness or numbness in your legs.  You have any problems urinating or having a bowel movement.     Your referring physician will call you with results in a few days.     We provide this literature for patients and family members.  It is intended to be educational supplement that highlights some of the important points of what we have previously discussed.                                    [facility]  Radiology Department  [phone]      Provider: BERNARDO Moore                   Proceduralist    Date: 3/26/24      Work Excuse/ Work Restriction / Return to Work      Nahdridae Connor  ______________ was seen in the Radiology Department today.   Please     excuse them as the physician has recommended additional

## 2024-03-27 LAB
BACTERIA SPEC CULT: NORMAL
GRAM STN SPEC: NORMAL
GRAM STN SPEC: NORMAL
SERVICE CMNT-IMP: NORMAL

## 2024-03-29 ENCOUNTER — APPOINTMENT (OUTPATIENT)
Facility: HOSPITAL | Age: 24
End: 2024-03-29
Payer: MEDICAID

## 2024-03-29 ENCOUNTER — HOSPITAL ENCOUNTER (EMERGENCY)
Facility: HOSPITAL | Age: 24
Discharge: HOME | End: 2024-03-29
Attending: EMERGENCY MEDICINE
Payer: MEDICAID

## 2024-03-29 VITALS
TEMPERATURE: 98 F | RESPIRATION RATE: 20 BRPM | SYSTOLIC BLOOD PRESSURE: 117 MMHG | DIASTOLIC BLOOD PRESSURE: 85 MMHG | OXYGEN SATURATION: 100 % | HEART RATE: 75 BPM | WEIGHT: 177.25 LBS | BODY MASS INDEX: 29.53 KG/M2 | HEIGHT: 65 IN

## 2024-03-29 DIAGNOSIS — R51.9 NONINTRACTABLE HEADACHE, UNSPECIFIED CHRONICITY PATTERN, UNSPECIFIED HEADACHE TYPE: ICD-10-CM

## 2024-03-29 DIAGNOSIS — R20.2 NUMBNESS AND TINGLING OF RIGHT ARM: Primary | ICD-10-CM

## 2024-03-29 DIAGNOSIS — R20.0 NUMBNESS AND TINGLING OF RIGHT ARM: Primary | ICD-10-CM

## 2024-03-29 PROCEDURE — 72125 CT NECK SPINE W/O DYE: CPT

## 2024-03-29 PROCEDURE — 70450 CT HEAD/BRAIN W/O DYE: CPT

## 2024-03-29 PROCEDURE — 6370000000 HC RX 637 (ALT 250 FOR IP): Performed by: EMERGENCY MEDICINE

## 2024-03-29 PROCEDURE — 99284 EMERGENCY DEPT VISIT MOD MDM: CPT

## 2024-03-29 RX ORDER — ACETAMINOPHEN 325 MG/1
650 TABLET ORAL
Status: COMPLETED | OUTPATIENT
Start: 2024-03-29 | End: 2024-03-29

## 2024-03-29 RX ADMIN — ACETAMINOPHEN 650 MG: 325 TABLET ORAL at 08:24

## 2024-03-29 ASSESSMENT — PAIN DESCRIPTION - DESCRIPTORS: DESCRIPTORS: ACHING

## 2024-03-29 ASSESSMENT — PAIN DESCRIPTION - PAIN TYPE: TYPE: ACUTE PAIN

## 2024-03-29 ASSESSMENT — PAIN - FUNCTIONAL ASSESSMENT
PAIN_FUNCTIONAL_ASSESSMENT: PREVENTS OR INTERFERES SOME ACTIVE ACTIVITIES AND ADLS
PAIN_FUNCTIONAL_ASSESSMENT: 0-10

## 2024-03-29 ASSESSMENT — ENCOUNTER SYMPTOMS
COUGH: 0
TROUBLE SWALLOWING: 0
ABDOMINAL PAIN: 0
COLOR CHANGE: 0
BACK PAIN: 0
SHORTNESS OF BREATH: 0

## 2024-03-29 ASSESSMENT — PAIN DESCRIPTION - ORIENTATION: ORIENTATION: ANTERIOR

## 2024-03-29 ASSESSMENT — PAIN DESCRIPTION - ONSET: ONSET: ON-GOING

## 2024-03-29 ASSESSMENT — PAIN SCALES - GENERAL: PAINLEVEL_OUTOF10: 8

## 2024-03-29 ASSESSMENT — PAIN DESCRIPTION - FREQUENCY: FREQUENCY: CONTINUOUS

## 2024-03-29 ASSESSMENT — PAIN DESCRIPTION - LOCATION: LOCATION: HEAD

## 2024-03-29 NOTE — ED TRIAGE NOTES
Patient reports she had lumbar puncture 3 days ago and now has headache and right arm numbness. Numbness started last night.

## 2024-03-29 NOTE — ED PROVIDER NOTES
below findings:        Interpretation per the Radiologist below, if available at the time of this note:    CT HEAD WO CONTRAST   Final Result      No evidence for acute intracranial abnormality               CT CERVICAL SPINE WO CONTRAST   Final Result   No acute fracture or subluxation. CT myelography or MR can be   performed for further evaluation, as indicated.              LABS:  Labs Reviewed - No data to display    All other labs were within normal range or not returned as of this dictation.    EMERGENCY DEPARTMENT COURSE and DIFFERENTIAL DIAGNOSIS/MDM:   Vitals:    Vitals:    03/29/24 0809 03/29/24 0945   BP: 127/78 117/85   Pulse: 77 75   Resp: 18 20   Temp: 98 °F (36.7 °C) 98 °F (36.7 °C)   TempSrc: Oral Oral   SpO2: 100% 100%   Weight: 80.4 kg (177 lb 4 oz)    Height: 1.651 m (5' 5\")            Medical Decision Making  Amount and/or Complexity of Data Reviewed  Radiology: ordered.    Risk  OTC drugs.            REASSESSMENT    Patient has been reexamined and denies any further complaints of pain or discomfort.  She was given copies of her CT of her head and CT neck reports.  Encouraged to have close follow-up with her neurologist for further evaluation and treatment.  Work note provided.  5:31 PM  Patient's results and plan of care have been reviewed with her.  Patient has verbally conveyed her understanding and agreement of her signs, symptoms, diagnosis, treatment and prognosis and additionally agrees to follow up as recommended or return to the Emergency Room should her condition change prior to follow-up.  Discharge instructions have also been provided to the patient with some educational information regarding her diagnosis as well a list of reasons why she would want to return to the ER prior to her follow-up appointment should her condition change.          CONSULTS:  None    PROCEDURES:  Unless otherwise noted below, none     Procedures      FINAL IMPRESSION      1. Numbness and tingling of right

## 2024-04-02 LAB
BACTERIA SPEC CULT: NORMAL
GRAM STN SPEC: NORMAL
GRAM STN SPEC: NORMAL
SERVICE CMNT-IMP: NORMAL

## 2024-05-02 ENCOUNTER — TELEPHONE (OUTPATIENT)
Age: 24
End: 2024-05-02

## 2024-05-02 NOTE — TELEPHONE ENCOUNTER
Patient returning phone call for sooner f/u appointment. Stated she was unable to make today's, 5/2/24 available appointment with NP and tomorrow 5/3/24 available appointment with Dr. Lake due to work. Informed her that she is put on waiting list for cancellation.

## 2024-05-08 RX ORDER — ACETAZOLAMIDE 250 MG/1
250 TABLET ORAL 2 TIMES DAILY
Qty: 120 TABLET | Refills: 1 | Status: SHIPPED | OUTPATIENT
Start: 2024-05-08

## 2024-05-29 RX ORDER — BUTALBITAL, ACETAMINOPHEN AND CAFFEINE 50; 325; 40 MG/1; MG/1; MG/1
1 TABLET ORAL 2 TIMES DAILY PRN
Qty: 30 TABLET | Refills: 1 | Status: SHIPPED | OUTPATIENT
Start: 2024-05-29

## 2024-06-10 ENCOUNTER — HOSPITAL ENCOUNTER (EMERGENCY)
Facility: HOSPITAL | Age: 24
Discharge: HOME | End: 2024-06-10
Attending: EMERGENCY MEDICINE
Payer: MEDICAID

## 2024-06-10 VITALS
TEMPERATURE: 97.9 F | HEART RATE: 75 BPM | RESPIRATION RATE: 15 BRPM | OXYGEN SATURATION: 99 % | DIASTOLIC BLOOD PRESSURE: 88 MMHG | SYSTOLIC BLOOD PRESSURE: 132 MMHG

## 2024-06-10 DIAGNOSIS — R51.9 CHRONIC NONINTRACTABLE HEADACHE, UNSPECIFIED HEADACHE TYPE: Primary | ICD-10-CM

## 2024-06-10 DIAGNOSIS — G89.29 CHRONIC NONINTRACTABLE HEADACHE, UNSPECIFIED HEADACHE TYPE: Primary | ICD-10-CM

## 2024-06-10 LAB
ALBUMIN SERPL-MCNC: 3.9 G/DL (ref 3.5–5)
ALBUMIN/GLOB SERPL: 1 (ref 1.1–2.2)
ALP SERPL-CCNC: 52 U/L (ref 45–117)
ALT SERPL-CCNC: 22 U/L (ref 12–78)
ANION GAP SERPL CALC-SCNC: 5 MMOL/L (ref 5–15)
AST SERPL-CCNC: 19 U/L (ref 15–37)
BASOPHILS # BLD: 0 K/UL (ref 0–0.1)
BASOPHILS NFR BLD: 1 % (ref 0–1)
BILIRUB SERPL-MCNC: 0.3 MG/DL (ref 0.2–1)
BUN SERPL-MCNC: 10 MG/DL (ref 6–20)
BUN/CREAT SERPL: 13 (ref 12–20)
CALCIUM SERPL-MCNC: 9.2 MG/DL (ref 8.5–10.1)
CHLORIDE SERPL-SCNC: 108 MMOL/L (ref 97–108)
CO2 SERPL-SCNC: 25 MMOL/L (ref 21–32)
COMMENT:: NORMAL
CREAT SERPL-MCNC: 0.8 MG/DL (ref 0.55–1.02)
DIFFERENTIAL METHOD BLD: ABNORMAL
EOSINOPHIL # BLD: 0.1 K/UL (ref 0–0.4)
EOSINOPHIL NFR BLD: 1 % (ref 0–7)
ERYTHROCYTE [DISTWIDTH] IN BLOOD BY AUTOMATED COUNT: 13.2 % (ref 11.5–14.5)
GLOBULIN SER CALC-MCNC: 3.8 G/DL (ref 2–4)
GLUCOSE SERPL-MCNC: 90 MG/DL (ref 65–100)
HCG SERPL QL: NEGATIVE
HCT VFR BLD AUTO: 36.6 % (ref 35–47)
HGB BLD-MCNC: 12.2 G/DL (ref 11.5–16)
IMM GRANULOCYTES # BLD AUTO: 0 K/UL (ref 0–0.04)
IMM GRANULOCYTES NFR BLD AUTO: 0 % (ref 0–0.5)
LYMPHOCYTES # BLD: 3.3 K/UL (ref 0.8–3.5)
LYMPHOCYTES NFR BLD: 38 % (ref 12–49)
MCH RBC QN AUTO: 32.3 PG (ref 26–34)
MCHC RBC AUTO-ENTMCNC: 33.3 G/DL (ref 30–36.5)
MCV RBC AUTO: 96.8 FL (ref 80–99)
MONOCYTES # BLD: 0.6 K/UL (ref 0–1)
MONOCYTES NFR BLD: 7 % (ref 5–13)
NEUTS SEG # BLD: 4.6 K/UL (ref 1.8–8)
NEUTS SEG NFR BLD: 53 % (ref 32–75)
NRBC # BLD: 0 K/UL (ref 0–0.01)
NRBC BLD-RTO: 0 PER 100 WBC
PLATELET # BLD AUTO: 403 K/UL (ref 150–400)
PMV BLD AUTO: 9.4 FL (ref 8.9–12.9)
POTASSIUM SERPL-SCNC: 3.7 MMOL/L (ref 3.5–5.1)
RBC # BLD AUTO: 3.78 M/UL (ref 3.8–5.2)
SODIUM SERPL-SCNC: 138 MMOL/L (ref 136–145)
SPECIMEN HOLD: NORMAL
WBC # BLD AUTO: 8.6 K/UL (ref 3.6–11)

## 2024-06-10 PROCEDURE — 80053 COMPREHEN METABOLIC PANEL: CPT

## 2024-06-10 PROCEDURE — 36415 COLL VENOUS BLD VENIPUNCTURE: CPT

## 2024-06-10 PROCEDURE — 2580000003 HC RX 258: Performed by: EMERGENCY MEDICINE

## 2024-06-10 PROCEDURE — 85025 COMPLETE CBC W/AUTO DIFF WBC: CPT

## 2024-06-10 PROCEDURE — 84703 CHORIONIC GONADOTROPIN ASSAY: CPT

## 2024-06-10 PROCEDURE — 99284 EMERGENCY DEPT VISIT MOD MDM: CPT

## 2024-06-10 RX ORDER — SODIUM CHLORIDE, SODIUM LACTATE, POTASSIUM CHLORIDE, AND CALCIUM CHLORIDE .6; .31; .03; .02 G/100ML; G/100ML; G/100ML; G/100ML
1000 INJECTION, SOLUTION INTRAVENOUS ONCE
Status: COMPLETED | OUTPATIENT
Start: 2024-06-10 | End: 2024-06-10

## 2024-06-10 RX ADMIN — SODIUM CHLORIDE, POTASSIUM CHLORIDE, SODIUM LACTATE AND CALCIUM CHLORIDE 1000 ML: 600; 310; 30; 20 INJECTION, SOLUTION INTRAVENOUS at 06:47

## 2024-06-10 ASSESSMENT — PAIN DESCRIPTION - ONSET: ONSET: PROGRESSIVE

## 2024-06-10 ASSESSMENT — ENCOUNTER SYMPTOMS
ANAL BLEEDING: 0
ABDOMINAL PAIN: 0
ABDOMINAL DISTENTION: 0
DIARRHEA: 0
NAUSEA: 0
BLOOD IN STOOL: 0
SHORTNESS OF BREATH: 0
VOMITING: 0
COUGH: 0

## 2024-06-10 ASSESSMENT — LIFESTYLE VARIABLES
HOW OFTEN DO YOU HAVE A DRINK CONTAINING ALCOHOL: 2-4 TIMES A MONTH
HOW MANY STANDARD DRINKS CONTAINING ALCOHOL DO YOU HAVE ON A TYPICAL DAY: 1 OR 2

## 2024-06-10 ASSESSMENT — PAIN - FUNCTIONAL ASSESSMENT
PAIN_FUNCTIONAL_ASSESSMENT: ACTIVITIES ARE NOT PREVENTED
PAIN_FUNCTIONAL_ASSESSMENT: 0-10

## 2024-06-10 ASSESSMENT — PAIN SCALES - GENERAL: PAINLEVEL_OUTOF10: 8

## 2024-06-10 ASSESSMENT — PAIN DESCRIPTION - LOCATION: LOCATION: HEAD

## 2024-06-10 ASSESSMENT — PAIN DESCRIPTION - FREQUENCY: FREQUENCY: CONTINUOUS

## 2024-06-10 ASSESSMENT — PAIN DESCRIPTION - PAIN TYPE: TYPE: ACUTE PAIN

## 2024-06-10 ASSESSMENT — PAIN DESCRIPTION - DESCRIPTORS: DESCRIPTORS: PRESSURE

## 2024-06-10 NOTE — ED PROVIDER NOTES
[facility] EMERGENCY DEP  EMERGENCY DEPARTMENT ENCOUNTER      Pt Name: Ina Connor  MRN: [id number]  Birthdate 2000  Date of evaluation: 6/10/2024  Provider: Todd Chavis MD    CHIEF COMPLAINT       Chief Complaint   Patient presents with    Headache         HISTORY OF PRESENT ILLNESS   (Location/Symptom, Timing/Onset, Context/Setting, Quality, Duration, Modifying Factors, Severity)  Note limiting factors.   23F w/ hx hx IIH p/w 1d HA. Pt reports long hx of chronic HA >6mo worse last night and right sided radiating throughout her head. Otherwise similar to previous HA which are chronic. Takes diamox for IIH and fioricet. NO CP/SOB, dizziness or syncope. Now states HA resolved and has no complaints. Denies any recent head injuries, hx of malignancy, brain surgeries, neck pain/stiffness, fevers, vomiting, vision/speech changes, gait abnl, facial droop, ext weakness/numbness, syncope, seizure or hx of intracranial aneurysms.              Review of External Medical Records:     Nursing Notes were reviewed.    REVIEW OF SYSTEMS    (2-9 systems for level 4, 10 or more for level 5)     Review of Systems   Constitutional:  Negative for diaphoresis and fever.   HENT:  Negative for nosebleeds.    Eyes:  Negative for visual disturbance.   Respiratory:  Negative for cough and shortness of breath.    Cardiovascular:  Negative for chest pain, palpitations and leg swelling.   Gastrointestinal:  Negative for abdominal distention, abdominal pain, anal bleeding, blood in stool, diarrhea, nausea and vomiting.   Endocrine: Negative for polyuria.   Genitourinary:  Negative for difficulty urinating, dysuria, frequency and hematuria.   Musculoskeletal:  Negative for joint swelling.   Skin:  Negative for wound.   Allergic/Immunologic: Negative for immunocompromised state.   Neurological:  Positive for headaches. Negative for seizures and syncope.   Hematological:  Does not bruise/bleed easily.   Psychiatric/Behavioral:  Negative for

## 2024-06-10 NOTE — ED NOTES
Pt states she wants to leave. MD notified and came to evaluate pt. IV removed per pt request. RN reviewed discharge instructions with the patient.  The patient verbalized understanding.  Ambulatory on discharge.

## 2024-06-12 ENCOUNTER — TELEPHONE (OUTPATIENT)
Age: 24
End: 2024-06-12

## 2024-06-12 NOTE — TELEPHONE ENCOUNTER
It appears that she has been to the emergency department today with treatment her headache was resolved       Yaya Lake MD

## 2024-06-12 NOTE — TELEPHONE ENCOUNTER
----- Message from Yaya Lake MD sent at 6/10/2024 12:13 PM EDT -----  Regarding: RE: Pressure  Contact: [phone]  It appears that she has been to the emergency department today with treatment her headache was resolved  ----- Message -----  From: Alejandra Rojas LPN  Sent: 6/10/2024   8:14 AM EDT  To: Yaya Lake MD  Subject: FW: Pressure                                       ----- Message -----  From: Ina Connor  Sent: 6/10/2024   2:39 AM EDT  To: #  Subject: Pressure                                         Tonight I’m not feeling very well. I have really bad pressure on side of my head and I feel very light headed. It feels like the pressure is in my face & I just need an appointment asap another lumbar puncture an mri something. This is really scary

## 2024-06-27 ENCOUNTER — TELEPHONE (OUTPATIENT)
Age: 24
End: 2024-06-27

## 2024-06-27 DIAGNOSIS — G93.2 IIH (IDIOPATHIC INTRACRANIAL HYPERTENSION): Primary | ICD-10-CM

## 2024-06-27 RX ORDER — PROPRANOLOL HYDROCHLORIDE 10 MG/1
10 TABLET ORAL 2 TIMES DAILY
Qty: 60 TABLET | Refills: 1 | Status: SHIPPED | OUTPATIENT
Start: 2024-06-27

## 2024-06-27 RX ORDER — UBROGEPANT 100 MG/1
100 TABLET ORAL AS NEEDED
Qty: 8 TABLET | Refills: 0 | Status: SHIPPED | OUTPATIENT
Start: 2024-06-27

## 2024-06-27 NOTE — TELEPHONE ENCOUNTER
Re: Beverly CHANG request in epic, key#  but qty is written as 8 instead of 10 also no Dx code is listed, sent secure message to nurse.

## 2024-06-30 ENCOUNTER — HOSPITAL ENCOUNTER (EMERGENCY)
Facility: HOSPITAL | Age: 24
Discharge: HOME | End: 2024-06-30
Payer: MEDICAID

## 2024-06-30 VITALS
WEIGHT: 179.01 LBS | SYSTOLIC BLOOD PRESSURE: 124 MMHG | RESPIRATION RATE: 18 BRPM | HEIGHT: 60 IN | DIASTOLIC BLOOD PRESSURE: 95 MMHG | OXYGEN SATURATION: 99 % | HEART RATE: 75 BPM | TEMPERATURE: 97.9 F | BODY MASS INDEX: 35.15 KG/M2

## 2024-06-30 DIAGNOSIS — R51.9 CHRONIC INTRACTABLE HEADACHE, UNSPECIFIED HEADACHE TYPE: Primary | ICD-10-CM

## 2024-06-30 DIAGNOSIS — G89.29 CHRONIC INTRACTABLE HEADACHE, UNSPECIFIED HEADACHE TYPE: Primary | ICD-10-CM

## 2024-06-30 PROCEDURE — 99282 EMERGENCY DEPT VISIT SF MDM: CPT

## 2024-06-30 ASSESSMENT — PAIN DESCRIPTION - PAIN TYPE: TYPE: ACUTE PAIN

## 2024-06-30 ASSESSMENT — PAIN - FUNCTIONAL ASSESSMENT
PAIN_FUNCTIONAL_ASSESSMENT: ACTIVITIES ARE NOT PREVENTED
PAIN_FUNCTIONAL_ASSESSMENT: 0-10

## 2024-06-30 ASSESSMENT — PAIN DESCRIPTION - DESCRIPTORS: DESCRIPTORS: PRESSURE

## 2024-06-30 ASSESSMENT — PAIN SCALES - GENERAL: PAINLEVEL_OUTOF10: 10

## 2024-06-30 ASSESSMENT — PAIN DESCRIPTION - LOCATION: LOCATION: HEAD

## 2024-06-30 ASSESSMENT — PAIN DESCRIPTION - ONSET: ONSET: ON-GOING

## 2024-06-30 ASSESSMENT — PAIN DESCRIPTION - FREQUENCY: FREQUENCY: CONTINUOUS

## 2024-06-30 NOTE — ED TRIAGE NOTES
Pt ambulatory through triage with c/o of fluid buildup and says she has papilloedema. Pt says that she feels like she needs a lumbar puncture and reports that she is having fluid buildup in her head and her back. She says that she has gotten one before and that this is what happened the last time. Pt reports head pressure

## 2024-06-30 NOTE — ED PROVIDER NOTES
[facility] EMERGENCY DEP  EMERGENCY DEPARTMENT ENCOUNTER      Pt Name: Ina Connor  MRN: [id number]  Birthdate 2000  Date of evaluation: 6/30/2024  Provider: Janusz Irene MD    CHIEF COMPLAINT       Chief Complaint   Patient presents with    papilloedema    Fluid Buildup         HISTORY OF PRESENT ILLNESS   (Location/Symptom, Timing/Onset, Context/Setting, Quality, Duration, Modifying Factors, Severity)  Note limiting factors.   23-year-old with a history of diabetes, idiopathic intracranial hypertension.  She presents with complaints of a generalized headache.  She has chronic headaches.  She is frequently in the emergency department for headaches.  She has recently seen an ophthalmologist who noted continued papilledema.  She reached out to her neurologist who initially recommended increasing her Diamox dose.  He then recommended propranolol for a week 3 days ago.  She says she has started this.  Her neurologist has referred her to interventional radiology to see if she is a candidate for stenting of her venous drainage system.  She requests an LP.          Review of External Medical Records:     Nursing Notes were reviewed.    REVIEW OF SYSTEMS    (2-9 systems for level 4, 10 or more for level 5)     Review of Systems    Except as noted above the remainder of the review of systems was reviewed and negative.       PAST MEDICAL HISTORY     Past Medical History:   Diagnosis Date    Diabetes mellitus (HCC)          SURGICAL HISTORY     No past surgical history on file.      CURRENT MEDICATIONS       Previous Medications    ACETAZOLAMIDE (DIAMOX) 250 MG TABLET    Take 1 tablet by mouth 2 times daily 1 twice daily for 1 week and then two twice daily    BUTALBITAL-ACETAMINOPHEN-CAFFEINE (FIORICET, ESGIC) -40 MG PER TABLET    Take 1 tablet by mouth 2 times daily as needed for Headaches    IBUPROFEN (ADVIL;MOTRIN) 800 MG TABLET    1 TAB(S) ORALLY 3 TIMES A DAY, AS NEEDED FOR PAIN TAKE WITH FOOD

## 2024-07-01 ENCOUNTER — TELEPHONE (OUTPATIENT)
Age: 24
End: 2024-07-01

## 2024-07-01 DIAGNOSIS — G43.709 CHRONIC MIGRAINE WITHOUT AURA, NOT INTRACTABLE, WITHOUT STATUS MIGRAINOSUS: Primary | ICD-10-CM

## 2024-07-01 RX ORDER — UBROGEPANT 100 MG/1
100 TABLET ORAL AS NEEDED
Qty: 10 TABLET | Refills: 1 | Status: SHIPPED | OUTPATIENT
Start: 2024-07-01

## 2024-07-01 NOTE — TELEPHONE ENCOUNTER
Re: Ubrelvy    Started PA request, per clincals pt must try 2 triptans but I cannot locate in chart, sent update to nurse.

## 2024-07-01 NOTE — TELEPHONE ENCOUNTER
Received a PA for Ubrelvy for this pt. Can you please see if provider is able to update script to qty of 10 and to please provide Dx code on script, PA is pending at this time. thanks.

## 2024-07-02 ENCOUNTER — TELEPHONE (OUTPATIENT)
Age: 24
End: 2024-07-02

## 2024-07-02 RX ORDER — SUMATRIPTAN 50 MG/1
50 TABLET, FILM COATED ORAL AS NEEDED
Qty: 9 TABLET | Refills: 0 | Status: SHIPPED | OUTPATIENT
Start: 2024-07-02

## 2024-07-02 NOTE — TELEPHONE ENCOUNTER
Please inform patient of this decision and I will send in sumatriptan for her to try for headaches      Yaya Lake MD

## 2024-07-02 NOTE — TELEPHONE ENCOUNTER
Patient has asked for a call back from the nurse.    No further information was shared with the PSR.

## 2024-07-02 NOTE — TELEPHONE ENCOUNTER
Patient called, verified and notified. She states \" I am having a lot of pressure in my head, is there something else he wants to put me on since the Diamox did not help?

## 2024-07-03 ENCOUNTER — OFFICE VISIT (OUTPATIENT)
Age: 24
End: 2024-07-03
Payer: MEDICAID

## 2024-07-03 VITALS
HEART RATE: 72 BPM | OXYGEN SATURATION: 98 % | BODY MASS INDEX: 34.55 KG/M2 | SYSTOLIC BLOOD PRESSURE: 124 MMHG | HEIGHT: 60 IN | WEIGHT: 176 LBS | RESPIRATION RATE: 16 BRPM | DIASTOLIC BLOOD PRESSURE: 78 MMHG

## 2024-07-03 DIAGNOSIS — G93.2 IIH (IDIOPATHIC INTRACRANIAL HYPERTENSION): Primary | ICD-10-CM

## 2024-07-03 DIAGNOSIS — G43.709 CHRONIC MIGRAINE WITHOUT AURA, NOT INTRACTABLE, WITHOUT STATUS MIGRAINOSUS: ICD-10-CM

## 2024-07-03 PROCEDURE — 99215 OFFICE O/P EST HI 40 MIN: CPT | Performed by: PSYCHIATRY & NEUROLOGY

## 2024-07-03 RX ORDER — AMOXICILLIN 875 MG/1
875 TABLET, COATED ORAL
COMMUNITY
Start: 2024-04-02 | End: 2024-07-03

## 2024-07-03 ASSESSMENT — PATIENT HEALTH QUESTIONNAIRE - PHQ9
SUM OF ALL RESPONSES TO PHQ QUESTIONS 1-9: 1
SUM OF ALL RESPONSES TO PHQ QUESTIONS 1-9: 1
SUM OF ALL RESPONSES TO PHQ9 QUESTIONS 1 & 2: 1
1. LITTLE INTEREST OR PLEASURE IN DOING THINGS: NOT AT ALL
2. FEELING DOWN, DEPRESSED OR HOPELESS: SEVERAL DAYS
SUM OF ALL RESPONSES TO PHQ QUESTIONS 1-9: 1
SUM OF ALL RESPONSES TO PHQ QUESTIONS 1-9: 1

## 2024-07-03 NOTE — PROGRESS NOTES
advised.      23x    ASSESSMENT   Diagnosis Orders   1. IIH (idiopathic intracranial hypertension)        2. Chronic migraine without aura, not intractable, without status migrainosus            DISCUSSION  Ms. Ina Connor has idiopathic intracranial hypertension and she has been experiencing daily headache, pulsatile tinnitus, light and noise sensitivity from it.  She has been on acetazolamide without any meaningful benefit.  She was tried on topiramate which she had difficulty tolerating.  Lumbar puncture back in March 2023 showed an opening pressure of 20 cm of water.  It did help for a few days with her headaches but then they came back.  It is possible that she has a mixed headache syndrome with underlying primary headache such as migraines.  She has recently started taking propranolol which has made a difference.  She was encouraged to try sumatriptan and for exacerbations and see if it helps.  Try topiramate again starting at 25 mg nightly and slowly titrate up to 100 mg/day as tolerated.  She is aware of the side effect profile of this medication including pregnancy category D and has no plans of pregnancy in the near future.  Holding off on any additional lumbar puncture procedures for now    She does have moderate to severe transverse sinus stenosis bilaterally and referral has been given to her to see neurointerventional surgery as she does not seem to be responding well to medical management alone     Total time 40 minutes    Yaya Lake MD  Diplomate, American Board of Psychiatry & Neurology (Neurology)  Diplomate, American Board of Psychiatry & Neurology (Clinical Neurophysiology)  Diplomate, American Board of Electrodiagnostic Medicine

## 2024-07-03 NOTE — TELEPHONE ENCOUNTER
The top 3 medications for elevated intracranial pressure are acetazolamide, topiramate and furosemide.    I do see that we prescribed topiramate back in January and she may need to reconsider it at 50 mg twice daily, titrated up to 100 mg twice daily.  If any issues with it, next medication would be furosemide 20 mg twice daily.  I reviewed ED notes from 2 days ago and the ED provider mentioned that she did not benefit from lumbar puncture last time and hence it was not performed             Yaya Lake MD

## 2024-08-13 ENCOUNTER — OFFICE VISIT (OUTPATIENT)
Age: 24
End: 2024-08-13
Payer: MEDICAID

## 2024-08-13 VITALS
DIASTOLIC BLOOD PRESSURE: 70 MMHG | HEIGHT: 60 IN | HEART RATE: 79 BPM | OXYGEN SATURATION: 99 % | TEMPERATURE: 98 F | WEIGHT: 172 LBS | BODY MASS INDEX: 33.77 KG/M2 | SYSTOLIC BLOOD PRESSURE: 110 MMHG

## 2024-08-13 DIAGNOSIS — G93.2 IIH (IDIOPATHIC INTRACRANIAL HYPERTENSION): Primary | ICD-10-CM

## 2024-08-13 PROCEDURE — 99203 OFFICE O/P NEW LOW 30 MIN: CPT | Performed by: PSYCHIATRY & NEUROLOGY

## 2024-08-13 RX ORDER — ACETAZOLAMIDE 250 MG/1
250 TABLET ORAL 2 TIMES DAILY
COMMUNITY

## 2024-08-13 NOTE — PROGRESS NOTES
New patient referred by Dr Lake presenting with IIH.  Patient reports daily headaches that become worse at night, episodes of double vision, and right side neck pain.  Reports she has seen ophthalmology recently.  No new problems reported.

## 2024-08-13 NOTE — PROGRESS NOTES
New Patient Visit         CONSULT INFORMATION:  Date of Service: 2024  Consultation Requested by: Aleksandra    PATIENT IDENTIFICATION:  Patient Name: Ina Connor  : 2000      CC: IIH    HISTORY OF PRESENT ILLNESS:  23 y.o. RH Black /  [2]female referred for IIH. She haspresumed IIH since . LP in [facility] OP was 20 but on diamox at that time. She reports 2 types of HA. Pressure type and throbbing type. She is now back on diamox and states that helps with pressure type HA. She was on topiramate and d/c it but states it did help with throbbing HA. She has been seen by neuro-eye, Dr Allen, and vision stable maybe mild papilledema.       Past Medical History:   Diagnosis Date    Diabetes mellitus (HCC)        No past surgical history on file.    Current Outpatient Medications   Medication Sig    acetaZOLAMIDE (DIAMOX) 250 MG tablet Take 1 tablet by mouth 2 times daily    ibuprofen (ADVIL;MOTRIN) 800 MG tablet     SUMAtriptan (IMITREX) 50 MG tablet Take 1 tablet by mouth as needed for Migraine (Patient not taking: Reported on 2024)    Ubrogepant (UBRELVY) 100 MG TABS Take 100 mg by mouth as needed (Migraine) (Patient not taking: Reported on 2024)    propranolol (INDERAL) 10 MG tablet Take 1 tablet by mouth 2 times daily (Patient not taking: Reported on 2024)     No current facility-administered medications for this visit.       No Known Allergies      Social History  Social History     Socioeconomic History    Marital status: Single     Spouse name: Not on file    Number of children: Not on file    Years of education: Not on file    Highest education level: Not on file   Occupational History    Not on file   Tobacco Use    Smoking status: Never    Smokeless tobacco: Never   Vaping Use    Vaping status: Every Day    Substances: Nicotine    Devices: Disposable   Substance and Sexual Activity    Alcohol use: Yes     Comment: sometimes    Drug use: Never    Sexual activity: Not on

## 2024-09-23 RX ORDER — IBUPROFEN 800 MG/1
800 TABLET, FILM COATED ORAL 2 TIMES DAILY PRN
Qty: 60 TABLET | Refills: 1 | Status: SHIPPED | OUTPATIENT
Start: 2024-09-23

## 2024-09-23 RX ORDER — DULOXETIN HYDROCHLORIDE 30 MG/1
30 CAPSULE, DELAYED RELEASE ORAL DAILY
Qty: 30 CAPSULE | Refills: 0 | Status: SHIPPED | OUTPATIENT
Start: 2024-09-23

## 2024-09-27 DIAGNOSIS — G44.86 CERVICOGENIC HEADACHE: Primary | ICD-10-CM

## 2024-10-02 DIAGNOSIS — M54.2 NECK PAIN: ICD-10-CM

## 2024-10-02 DIAGNOSIS — G93.2 IIH (IDIOPATHIC INTRACRANIAL HYPERTENSION): Primary | ICD-10-CM

## 2024-10-30 ENCOUNTER — HOSPITAL ENCOUNTER (OUTPATIENT)
Facility: HOSPITAL | Age: 24
Discharge: HOME | End: 2024-11-02
Attending: PSYCHIATRY & NEUROLOGY
Payer: MEDICAID

## 2024-10-30 DIAGNOSIS — G44.86 CERVICOGENIC HEADACHE: ICD-10-CM

## 2024-10-30 PROCEDURE — 72141 MRI NECK SPINE W/O DYE: CPT

## 2024-11-06 ENCOUNTER — ANESTHESIA EVENT (OUTPATIENT)
Facility: HOSPITAL | Age: 24
End: 2024-11-06
Payer: MEDICAID

## 2024-11-06 NOTE — ANESTHESIA PRE PROCEDURE
regular                      Neuro/Psych:   (+) headaches: migraine headaches            GI/Hepatic/Renal:            ROS comment: Rectal bleed.   Endo/Other:                     Abdominal:             Vascular:          Other Findings:       Anesthesia Plan      TIVA     ASA 2       Induction: intravenous.  continuous noninvasive hemodynamic monitor    Anesthetic plan and risks discussed with patient.    Use of blood products discussed with patient whom.    Plan discussed with CRNA.                SHERRON Bryant MD   11/6/2024

## 2024-11-07 ENCOUNTER — HOSPITAL ENCOUNTER (OUTPATIENT)
Facility: HOSPITAL | Age: 24
Setting detail: OUTPATIENT SURGERY
Discharge: HOME | End: 2024-11-07
Attending: INTERNAL MEDICINE | Admitting: INTERNAL MEDICINE
Payer: MEDICAID

## 2024-11-07 ENCOUNTER — ANESTHESIA (OUTPATIENT)
Facility: HOSPITAL | Age: 24
End: 2024-11-07
Payer: MEDICAID

## 2024-11-07 VITALS
HEIGHT: 61 IN | WEIGHT: 119 LBS | TEMPERATURE: 97.9 F | SYSTOLIC BLOOD PRESSURE: 108 MMHG | HEART RATE: 68 BPM | OXYGEN SATURATION: 100 % | DIASTOLIC BLOOD PRESSURE: 56 MMHG | RESPIRATION RATE: 19 BRPM | BODY MASS INDEX: 22.47 KG/M2

## 2024-11-07 PROCEDURE — 7100000010 HC PHASE II RECOVERY - FIRST 15 MIN: Performed by: INTERNAL MEDICINE

## 2024-11-07 PROCEDURE — 2709999900 HC NON-CHARGEABLE SUPPLY: Performed by: INTERNAL MEDICINE

## 2024-11-07 PROCEDURE — 3600007512: Performed by: INTERNAL MEDICINE

## 2024-11-07 PROCEDURE — 3700000000 HC ANESTHESIA ATTENDED CARE: Performed by: INTERNAL MEDICINE

## 2024-11-07 PROCEDURE — 7100000011 HC PHASE II RECOVERY - ADDTL 15 MIN: Performed by: INTERNAL MEDICINE

## 2024-11-07 PROCEDURE — 3600007502: Performed by: INTERNAL MEDICINE

## 2024-11-07 PROCEDURE — 88305 TISSUE EXAM BY PATHOLOGIST: CPT

## 2024-11-07 PROCEDURE — 2580000003 HC RX 258

## 2024-11-07 PROCEDURE — 3700000001 HC ADD 15 MINUTES (ANESTHESIA): Performed by: INTERNAL MEDICINE

## 2024-11-07 PROCEDURE — 2500000003 HC RX 250 WO HCPCS

## 2024-11-07 PROCEDURE — 6360000002 HC RX W HCPCS

## 2024-11-07 RX ORDER — SODIUM CHLORIDE 9 MG/ML
INJECTION, SOLUTION INTRAVENOUS
Status: DISCONTINUED | OUTPATIENT
Start: 2024-11-07 | End: 2024-11-07 | Stop reason: SDUPTHER

## 2024-11-07 RX ORDER — SODIUM CHLORIDE 9 MG/ML
INJECTION, SOLUTION INTRAVENOUS CONTINUOUS
Status: DISCONTINUED | OUTPATIENT
Start: 2024-11-07 | End: 2024-11-07 | Stop reason: HOSPADM

## 2024-11-07 RX ORDER — SODIUM CHLORIDE 0.9 % (FLUSH) 0.9 %
5-40 SYRINGE (ML) INJECTION PRN
Status: DISCONTINUED | OUTPATIENT
Start: 2024-11-07 | End: 2024-11-07 | Stop reason: HOSPADM

## 2024-11-07 RX ORDER — SIMETHICONE 40MG/0.6ML
40 SUSPENSION, DROPS(FINAL DOSAGE FORM)(ML) ORAL AS NEEDED
Status: DISCONTINUED | OUTPATIENT
Start: 2024-11-07 | End: 2024-11-07 | Stop reason: HOSPADM

## 2024-11-07 RX ORDER — LIDOCAINE HYDROCHLORIDE 20 MG/ML
INJECTION, SOLUTION EPIDURAL; INFILTRATION; INTRACAUDAL; PERINEURAL
Status: DISCONTINUED | OUTPATIENT
Start: 2024-11-07 | End: 2024-11-07 | Stop reason: SDUPTHER

## 2024-11-07 RX ADMIN — PROPOFOL 20 MG: 10 INJECTION, EMULSION INTRAVENOUS at 14:41

## 2024-11-07 RX ADMIN — PROPOFOL 40 MG: 10 INJECTION, EMULSION INTRAVENOUS at 14:31

## 2024-11-07 RX ADMIN — PROPOFOL 40 MG: 10 INJECTION, EMULSION INTRAVENOUS at 14:38

## 2024-11-07 RX ADMIN — PROPOFOL 40 MG: 10 INJECTION, EMULSION INTRAVENOUS at 14:35

## 2024-11-07 RX ADMIN — PROPOFOL 100 MG: 10 INJECTION, EMULSION INTRAVENOUS at 14:27

## 2024-11-07 RX ADMIN — LIDOCAINE HYDROCHLORIDE 100 MG: 20 INJECTION, SOLUTION EPIDURAL; INFILTRATION; INTRACAUDAL; PERINEURAL at 14:27

## 2024-11-07 RX ADMIN — PROPOFOL 40 MG: 10 INJECTION, EMULSION INTRAVENOUS at 14:33

## 2024-11-07 RX ADMIN — PROPOFOL 20 MG: 10 INJECTION, EMULSION INTRAVENOUS at 14:39

## 2024-11-07 RX ADMIN — PROPOFOL 50 MG: 10 INJECTION, EMULSION INTRAVENOUS at 14:28

## 2024-11-07 RX ADMIN — PROPOFOL 50 MG: 10 INJECTION, EMULSION INTRAVENOUS at 14:29

## 2024-11-07 RX ADMIN — SODIUM CHLORIDE: 9 INJECTION, SOLUTION INTRAVENOUS at 14:21

## 2024-11-07 ASSESSMENT — PAIN - FUNCTIONAL ASSESSMENT: PAIN_FUNCTIONAL_ASSESSMENT: NONE - DENIES PAIN

## 2024-11-07 NOTE — H&P
[facility]  Outpatient History and Physical    Patient: Ina Connor    Physician: Caitie Martínez MD    Vital Signs: Blood pressure 119/66, pulse 72, temperature 98.1 °F (36.7 °C), temperature source Tympanic, resp. rate 20, height 1.549 m (5' 1\"), weight 54 kg (119 lb), SpO2 98%.    Allergies:   No Known Allergies    Chief Complaint: heartburn, chest pain, blood in the stool    History of Present Illness: heartburn, chest pain, blood in the stool    Indication for Procedure: heartburn, chest pain, blood in the stool    History:  History reviewed. No pertinent past medical history.   History reviewed. No pertinent surgical history.   Social History     Socioeconomic History    Marital status: Single     Spouse name: None    Number of children: None    Years of education: None    Highest education level: None   Tobacco Use    Smoking status: Never    Smokeless tobacco: Never   Vaping Use    Vaping status: Every Day    Substances: Nicotine    Devices: Disposable   Substance and Sexual Activity    Alcohol use: Yes     Comment: sometimes    Drug use: Never     Social Determinants of Health     Financial Resource Strain: Medium Risk (6/13/2023)    Received from [facility] [employer] [facility]    Overall Financial Resource Strain (CARDIA)     Difficulty of Paying Living Expenses: Somewhat hard   Food Insecurity: No Food Insecurity (6/13/2023)    Received from [facility] [employer] [facility]    Hunger Vital Sign     Worried About Running Out of Food in the Last Year: Never true     Ran Out of Food in the Last Year: Never true   Transportation Needs: Unmet Transportation Needs (6/13/2023)    Received from [facility] [employer] [facility]    PRAPARE - Transportation     Lack of Transportation (Medical): No     Lack of Transportation (Non-Medical): Yes   Housing Stability: Unknown (6/13/2023)    [facility]    Housing Stability Vital Sign     Number of Places Lived in the Last Year: 1

## 2024-11-07 NOTE — DISCHARGE INSTRUCTIONS
Elizabethdridae Nikolas  [id number]  2000    COLON/EGD DISCHARGE INSTRUCTIONS  Discomfort:  Redness at IV site- apply warm compress to area; if redness or soreness persist- contact your physician  There may be a slight amount of blood passed from the rectum  Gaseous discomfort- walking, belching will help relieve any discomfort  Sore throat- throat lozenges or warm salt water gargle  You may not operate a vehicle for 12 hours  You may not engage in an occupation involving machinery or appliances for rest of today  You may not drink alcoholic beverages for at least 12 hours  Avoid making any critical decisions for at least 24 hour  DIET:   High fiber diet.   - however -  remember your colon is empty and a heavy meal will produce gas.   Avoid these foods:  vegetables, fried / greasy foods, carbonated drinks for today.    MEDICATIONS:      Regarding Aspirin or Nonsteroidal medications, please see below.    ACTIVITY:  You may resume your normal daily activities it is recommended that you spend the remainder of the day resting -  avoid any strenuous activity.    CALL M.D.  ANY SIGN OF:  Increasing pain, nausea, vomiting  Abdominal distension (swelling)  New increased bleeding (oral or rectal)  Fever (chills)  Pain in chest area  Bloody discharge from nose or mouth  Shortness of breath    The use of some over-the-counter pain medication may lead to bleeding after biopsies or other procedures you may have had done.  Tylenol (also called acetaminophen) is safe to take and will not lead to bleeding.      Impressions:  EGD: Small erosion in the prepyloric region, otherwise normal EGD.  Gastric and esophageal biopsies taken  Colonoscopy: Poor bowel prep, aborted from distal sigmoid colon    Recommendations:   - Await pathology results  - Resume diet as tolerated  - Schedule colonoscopy at a later date with proper prep instruction reinforcement    Follow-up Instructions:   Call Dr. Lyudmila Martínez  Results of procedure / biopsy  in 10 days  Telephone #  [phone]     Patient Education on Sedation / Analgesia Administered for Procedure      For 24 hours after general anesthesia or intravenous analgesia / sedation:  Have someone responsible help you with your care  Limit your activities  Do not drive and operate hazardous machinery  Do not make important personal, legal or business decisions  Do not drink alcoholic beverages  If you have not urinated within 8 hours after discharge, please contact your physician  Resume your medications unless otherwise instructed    For 24 hours after general anesthesia or intravenous analgesia / sedation  you may experience:  Drowsiness, dizziness, sleepiness, or confusion  Difficulty remembering or delayed reaction times  Difficulty with your balance, especially while walking, move slowly and carefully, do not make sudden position changes  Difficulty focusing or blurred vision    You may not be aware of slight changes in your behavior and/or your reaction time because of the medication used during and after your procedure.    Report the following to your physician:  Excessive pain, swelling, redness or odor of or around the surgical area  Temperature over 100.5  Nausea and vomiting lasting longer than 4 hours or if unable to take medications  Any signs of decreased circulation or nerve impairment to extremity: change in color, persistent numbness, tingling, coldness or increase pain  Any questions or concerns    IF YOU REPORT TO AN EMERGENCY ROOM, DOCTOR'S OFFICE OR HOSPITAL WITHIN 24 HOURS AFTER YOUR PROCEDURE, BRING THIS SHEET AND YOUR AFTER VISIT SUMMARY WITH YOU AND GIVE IT TO THE PHYSICIAN OR NURSE ATTENDING YOU.

## 2024-11-07 NOTE — ANESTHESIA POSTPROCEDURE EVALUATION
Department of Anesthesiology  Postprocedure Note    Patient: Ina Connor  MRN: [id number]  YOB: 2000  Date of evaluation: 11/7/2024    Procedure Summary       Date: 11/07/24 Room / Location: [facility] ENDO 02 / [facility] ENDOSCOPY    Anesthesia Start: 1421 Anesthesia Stop: 1446    Procedures:       ESOPHAGOGASTRODUODENOSCOPY, COLONOSCOPY (Upper GI Region)      COLONOSCOPY DIAGNOSTIC (Lower GI Region) Diagnosis:       Rectal bleeding      Chronic idiopathic constipation      Heartburn      Epigastric pain      (Rectal bleeding [K62.5])      (Chronic idiopathic constipation [K59.04])      (Heartburn [R12])      (Epigastric pain [R10.13])    Surgeons: Caitie Martínez MD Responsible Provider: KHALIDA Bryant MD    Anesthesia Type: MAC ASA Status: 2            Anesthesia Type: MAC    Veronica Phase I: Veronica Score: 10    Veronica Phase II:      Anesthesia Post Evaluation    Patient location during evaluation: bedside  Patient participation: complete - patient participated  Level of consciousness: responsive to verbal stimuli and awake and alert  Pain score: 2  Nausea & Vomiting: no nausea  Cardiovascular status: blood pressure returned to baseline  Respiratory status: acceptable  Hydration status: euvolemic  Multimodal analgesia pain management approach  Pain management: adequate    No notable events documented.

## 2024-11-07 NOTE — PROGRESS NOTES
ARRIVAL INFORMATION:  Verified patient name and date of birth, scheduled procedure, and informed consent.     : Gerald  significant other contact number: [phone]  Physician and staff can share information with the .     Receive texts: y    Belongings with patient include:  Clothing,Glasses, phone, purse    GI FOCUSED ASSESSMENT:  Neuro: Awake, alert, oriented x4  Respiratory: even and unlabored   GI: soft and non-distended  EKG Rhythm: normal sinus rhythm    Education:Reviewed general discharge instructions and  information.     The risks and benefits of the bite block have been explained to patient.  Patient verbalizes understanding.

## 2024-11-07 NOTE — PROGRESS NOTES
Endoscopy Case End Note:    1441:  Procedure scope was pre-cleaned, per protocol, at bedside by Kaley.      1441:  Report received from anesthesia - CLINT Ag.  See anesthesia flowsheet for intra-procedure vital signs and events.      1444- glasses and lip ring and phone returned to patient.

## 2024-11-07 NOTE — OP NOTE
[facility]  [facility]  Lyudmila Martínez MD, JD [facility]  [phone]         2024    Esophagogastroduodenoscopy & Colonoscopy Procedure Note  Ina Connor  : 2000  [facility] Medical Record Number: [id number]      Indications:  heartburn, chest pain, blood in the stool  Referring Physician:  Lulu Stone PA  Anesthesia/Sedation: Conscious Sedation/Moderate Sedation/MAC  Endoscopist:  Dr. Lyudmila Martínez  Complications:  None  Estimated Blood Loss:  None    Permit:  The indications, risks, benefits and alternatives were reviewed with the patient or their decision maker who was provided an opportunity to ask questions and all questions were answered.  The specific risks of esophagogastroduodenoscopy with conscious sedation were reviewed, including but not limited to anesthetic complication, bleeding, adverse drug reaction, missed lesion, infection, IV site reactions, and intestinal perforation which would lead to the need for surgical repair.  Alternatives to EGD and colonoscopy including radiographic imaging, observation without testing, or laboratory testing were reviewed as well as the limitations of those alternatives discussed.  After considering the options and having all their questions answered, the patient or their decision maker provided both verbal and written consent to proceed.      -----------EGD------------   Procedure in Detail:  After obtaining informed consent, positioning of the patient in the left lateral decubitus position, and conduction of a pre-procedure pause or \"time out\" the endoscope was introduced into the mouth and advanced to the duodenum.  A careful inspection was made, and findings or interventions are described below.    Findings:   Esophagus: Z-line at 38 cm from the incisors.  Normal Z-line in the entire esophagus.  Multiple cold forceps biopsies are taken from proximal and distal  esophagus to rule out eosinophilic esophagitis.  No signs of erosive esophagitis.  Hill valve grade 3.  Stomach: There is a single erosion in the prepyloric area with some heme, otherwise normal gastric mucosa.  No ulcer.  Patent pylorus.  Multiple cold forceps biopsies are taken from antrum and body to rule out H. pylori  Duodenum/jejunum: Normal examined duodenal segment        ----------Colonoscopy-----------    Procedure in Detail:  After obtaining informed consent, positioning of the patient in the left lateral decubitus position, and conduction of a pre-procedure pause or \"time out\" the endoscope was introduced into the anus and advanced to the sigmoid colon.  The quality of the colonic preparation was inadequate.  A careful inspection was made as the colonoscope was withdrawn, findings and interventions are described below.    Findings:  - CLAUDE Normal  - There is semisolid brown stool in the rectum and distal sigmoid colon so procedure was aborted from sigmoid colon.   - Retroflexion in the rectum was not performed.    ------------------------------  Specimens:    See above    Complications:   None; patient tolerated the procedure well.    Impressions:  EGD: Small erosion in the prepyloric region, otherwise normal EGD.  Gastric and esophageal biopsies taken  Colonoscopy: Poor bowel prep, aborted from distal sigmoid colon    Recommendations:   - Await pathology results  - Resume diet as tolerated  - Schedule colonoscopy at a later date with proper prep instruction reinforcement    Thank you for entrusting me with this patient's care.  Please do not hesitate to contact me with any questions or if I can be of assistance with any of your other patients' GI needs.    Signed By: Caitie Martínez MD                        November 7, 2024    Surgical assistant none.  Implants none unless specified.

## 2024-12-13 ENCOUNTER — ANCILLARY ORDERS (OUTPATIENT)
Age: 24
End: 2024-12-13

## 2024-12-13 DIAGNOSIS — G93.2 IIH (IDIOPATHIC INTRACRANIAL HYPERTENSION): Primary | ICD-10-CM

## 2024-12-13 DIAGNOSIS — G93.2 IIH (IDIOPATHIC INTRACRANIAL HYPERTENSION): ICD-10-CM

## 2024-12-13 DIAGNOSIS — G43.709 CHRONIC MIGRAINE WITHOUT AURA, NOT INTRACTABLE, WITHOUT STATUS MIGRAINOSUS: ICD-10-CM

## 2025-01-13 ENCOUNTER — HOSPITAL ENCOUNTER (OUTPATIENT)
Facility: HOSPITAL | Age: 25
Discharge: HOME | End: 2025-01-16
Attending: PSYCHIATRY & NEUROLOGY
Payer: MEDICAID

## 2025-01-13 DIAGNOSIS — G43.709 CHRONIC MIGRAINE WITHOUT AURA, NOT INTRACTABLE, WITHOUT STATUS MIGRAINOSUS: ICD-10-CM

## 2025-01-13 DIAGNOSIS — G93.2 IIH (IDIOPATHIC INTRACRANIAL HYPERTENSION): ICD-10-CM

## 2025-01-13 LAB
APPEARANCE CSF: CLEAR
COLOR CSF: COLORLESS
COMMENT:: NORMAL
GLUCOSE CSF-MCNC: 61 MG/DL (ref 40–70)
PROT CSF-MCNC: 22 MG/DL (ref 15–45)
RBC # CSF: 0 /CU MM
SPECIMEN HOLD: NORMAL
TUBE # CSF: 1
TUBE # CSF: 1
TUBE # CSF: 3
WBC # CSF: 0 /CU MM (ref 0–5)

## 2025-01-13 PROCEDURE — 89050 BODY FLUID CELL COUNT: CPT

## 2025-01-13 PROCEDURE — 87205 SMEAR GRAM STAIN: CPT

## 2025-01-13 PROCEDURE — 62328 DX LMBR SPI PNXR W/FLUOR/CT: CPT

## 2025-01-13 PROCEDURE — 87070 CULTURE OTHR SPECIMN AEROBIC: CPT

## 2025-01-13 PROCEDURE — 84157 ASSAY OF PROTEIN OTHER: CPT

## 2025-01-13 PROCEDURE — 82945 GLUCOSE OTHER FLUID: CPT

## 2025-01-13 NOTE — DISCHARGE INSTRUCTIONS
Spinal Tap (Lumbar Puncture): What to Expect at Home  Your Recovery     A spinal tap (also called a lumbar puncture) is a test to check the fluid that surrounds and protects your spinal cord and brain. Your doctor may have done this test to look for an infection. Sometimes it's done to release pressure from too much fluid or to look for diseases such as multiple sclerosis.  You may feel tired, and your back may be sore where the needle went in (the puncture site). You may have a mild headache for a day or two. This can happen when some of the spinal fluid is removed. Drinking extra fluids, taking pain medicine, and lying down for several hours after the procedure may help the headache be less severe. Some people also have trouble sleeping for a day or two.  The fluid taken during a spinal tap is often sent to a lab for tests. Your doctor or nurse will call you with the test results.  This care sheet gives you a general idea about how long it will take for you to recover. But each person recovers at a different pace. Follow the steps below to get better as quickly as possible.  How can you care for yourself at home?  Activity    Your doctor may tell you to lie flat in bed for 1 to 4 hours after the procedure.     Rest when you feel tired. Getting enough sleep will help you recover.     Wait 24 hours to drive.  No showering for 24 hours. Do not take a tub bath or go swimming for 2 weeks, or until site has healed.   Diet    Drink extra fluids after the procedure to help a headache be less severe.   Medicines    If you stopped taking aspirin or some other blood thinner, your doctor will tell you when to start taking it again.     If you have pain, take pain medicines exactly as directed.  If the doctor gave you a prescription medicine for pain, take it as prescribed.  If you are not taking a prescription pain medicine, ask your doctor if you can take an over-the-counter medicine.     If you think your pain medicine

## 2025-01-14 LAB
BACTERIA SPEC CULT: NORMAL
GRAM STN SPEC: NORMAL
SERVICE CMNT-IMP: NORMAL

## 2025-01-20 LAB
BACTERIA SPEC CULT: NORMAL
GRAM STN SPEC: NORMAL
SERVICE CMNT-IMP: NORMAL

## 2025-01-27 RX ORDER — TOPIRAMATE 50 MG/1
50 TABLET, FILM COATED ORAL 2 TIMES DAILY
Qty: 180 TABLET | Refills: 1 | Status: SHIPPED | OUTPATIENT
Start: 2025-01-27

## 2025-02-26 ENCOUNTER — HOSPITAL ENCOUNTER (EMERGENCY)
Facility: HOSPITAL | Age: 25
Discharge: HOME | End: 2025-02-26
Payer: MEDICAID

## 2025-02-26 VITALS
OXYGEN SATURATION: 99 % | DIASTOLIC BLOOD PRESSURE: 83 MMHG | TEMPERATURE: 98.7 F | WEIGHT: 195.11 LBS | BODY MASS INDEX: 38.31 KG/M2 | SYSTOLIC BLOOD PRESSURE: 130 MMHG | HEIGHT: 60 IN | RESPIRATION RATE: 16 BRPM | HEART RATE: 73 BPM

## 2025-02-26 DIAGNOSIS — J10.1 INFLUENZA A: Primary | ICD-10-CM

## 2025-02-26 LAB
FLUAV RNA SPEC QL NAA+PROBE: DETECTED
FLUBV RNA SPEC QL NAA+PROBE: NOT DETECTED
SARS-COV-2 RNA RESP QL NAA+PROBE: NOT DETECTED
SOURCE: ABNORMAL

## 2025-02-26 PROCEDURE — 87636 SARSCOV2 & INF A&B AMP PRB: CPT

## 2025-02-26 PROCEDURE — 6370000000 HC RX 637 (ALT 250 FOR IP): Performed by: PHYSICIAN ASSISTANT

## 2025-02-26 PROCEDURE — 99283 EMERGENCY DEPT VISIT LOW MDM: CPT

## 2025-02-26 RX ORDER — ONDANSETRON 4 MG/1
4 TABLET, ORALLY DISINTEGRATING ORAL 3 TIMES DAILY PRN
Qty: 21 TABLET | Refills: 0 | Status: SHIPPED | OUTPATIENT
Start: 2025-02-26

## 2025-02-26 RX ORDER — BUTALBITAL, ACETAMINOPHEN AND CAFFEINE 50; 325; 40 MG/1; MG/1; MG/1
1 TABLET ORAL
Status: COMPLETED | OUTPATIENT
Start: 2025-02-26 | End: 2025-02-26

## 2025-02-26 RX ORDER — ACETAMINOPHEN 325 MG/1
650 TABLET ORAL EVERY 6 HOURS PRN
Qty: 30 TABLET | Refills: 0 | Status: SHIPPED | OUTPATIENT
Start: 2025-02-26

## 2025-02-26 RX ORDER — IBUPROFEN 400 MG/1
400 TABLET, FILM COATED ORAL EVERY 6 HOURS PRN
Qty: 30 TABLET | Refills: 0 | Status: SHIPPED | OUTPATIENT
Start: 2025-02-26

## 2025-02-26 RX ORDER — OSELTAMIVIR PHOSPHATE 75 MG/1
75 CAPSULE ORAL 2 TIMES DAILY
Qty: 10 CAPSULE | Refills: 0 | Status: SHIPPED | OUTPATIENT
Start: 2025-02-26 | End: 2025-03-03

## 2025-02-26 RX ORDER — IBUPROFEN 600 MG/1
600 TABLET, FILM COATED ORAL
Status: COMPLETED | OUTPATIENT
Start: 2025-02-26 | End: 2025-02-26

## 2025-02-26 RX ADMIN — BUTALBITAL, ACETAMINOPHEN, AND CAFFEINE 1 TABLET: 325; 50; 40 TABLET ORAL at 19:41

## 2025-02-26 RX ADMIN — IBUPROFEN 600 MG: 600 TABLET, FILM COATED ORAL at 19:41

## 2025-02-26 ASSESSMENT — LIFESTYLE VARIABLES
HOW MANY STANDARD DRINKS CONTAINING ALCOHOL DO YOU HAVE ON A TYPICAL DAY: PATIENT DECLINED
HOW OFTEN DO YOU HAVE A DRINK CONTAINING ALCOHOL: PATIENT DECLINED

## 2025-02-26 ASSESSMENT — PAIN SCALES - GENERAL
PAINLEVEL_OUTOF10: 9
PAINLEVEL_OUTOF10: 9

## 2025-02-26 ASSESSMENT — PAIN DESCRIPTION - DESCRIPTORS: DESCRIPTORS: BURNING

## 2025-02-26 ASSESSMENT — PAIN DESCRIPTION - LOCATION
LOCATION: HEAD
LOCATION: HEAD

## 2025-02-26 ASSESSMENT — PAIN - FUNCTIONAL ASSESSMENT: PAIN_FUNCTIONAL_ASSESSMENT: 0-10

## 2025-02-26 ASSESSMENT — PAIN DESCRIPTION - ORIENTATION: ORIENTATION: MID

## 2025-02-27 NOTE — ED PROVIDER NOTES
Oral      SpO2 99 %      Weight - Scale 88.5 kg (195 lb 1.7 oz)      Height 1.524 m (5')      Head Circumference       Peak Flow       Pain Score       Pain Loc       Pain Education       Exclude from Growth Chart         Physical Exam     DIAGNOSTIC RESULTS   LABS:     Recent Results (from the past 24 hour(s))   COVID-19 & Influenza Combo    Collection Time: 02/26/25  7:17 PM    Specimen: Nasopharyngeal   Result Value Ref Range    Source Nasopharyngeal      SARS-CoV-2, PCR Not detected NOTD      Rapid Influenza A By PCR Detected (A) NOTD      Rapid Influenza B By PCR Not detected NOTD         EKG: When ordered, EKG's are interpreted by the Emergency Department Physician in the absence of a cardiologist.  Please see their note for interpretation of EKG.      RADIOLOGY:  Non-plain film images such as CT, Ultrasound and MRI are read by the radiologist. Plain radiographic images are visualized and preliminarily interpreted by the ED Provider with the below findings:     ***     Interpretation per the Radiologist below, if available at the time of this note:     No orders to display        PROCEDURES   Unless otherwise noted below, none  Procedures     CRITICAL CARE TIME   ***    EMERGENCY DEPARTMENT COURSE and DIFFERENTIAL DIAGNOSIS/MDM   Vitals:    Vitals:    02/26/25 1814   BP: 130/83   Pulse: 73   Resp: 16   Temp: 98.7 °F (37.1 °C)   TempSrc: Oral   SpO2: 99%   Weight: 88.5 kg (195 lb 1.7 oz)   Height: 1.524 m (5')        Patient was given the following medications:  Medications   ibuprofen (ADVIL;MOTRIN) tablet 600 mg (600 mg Oral Given 2/26/25 1941)   butalbital-acetaminophen-caffeine (FIORICET, ESGIC) per tablet 1 tablet (1 tablet Oral Given 2/26/25 1941)       CONSULTS: (Who and What was discussed)  None    Chronic Conditions: ***    Social Determinants affecting Dx or Tx: {Social Barriers (Optional):70693}    Records Reviewed (source and summary of external records): {CDIRECORDSREVIEWED:0320925288}    MDM  hours as needed for Pain or Fever     ibuprofen 400 MG tablet  Commonly known as: IBU  Take 1 tablet by mouth every 6 hours as needed for Pain or Fever     ondansetron 4 MG disintegrating tablet  Commonly known as: ZOFRAN-ODT  Take 1 tablet by mouth 3 times daily as needed for Nausea or Vomiting     oseltamivir 75 MG capsule  Commonly known as: TAMIFLU  Take 1 capsule by mouth 2 times daily for 5 days            ASK your doctor about these medications      topiramate 50 MG tablet  Commonly known as: TOPAMAX  Take 1 tablet by mouth 2 times daily May increase to 100 mg BID after 1 wk               Where to Get Your Medications        These medications were sent to [facility]/pharmacy #[id number] - [place], [place] - [address] -  [phone] - F [phone]  [address] [facility] [facility] [address]      Phone: [phone]   acetaminophen 325 MG tablet  ibuprofen 400 MG tablet  ondansetron 4 MG disintegrating tablet  oseltamivir 75 MG capsule           DISCONTINUED MEDICATIONS:  Current Discharge Medication List            I am the Primary Clinician of Record.   BERNARDO Toussaint (electronically signed)    (Please note that parts of this dictation were completed with voice recognition software. Quite often unanticipated grammatical, syntax, homophones, and other interpretive errors are inadvertently transcribed by the computer software. Please disregards these errors. Please excuse any errors that have escaped final proofreading.)         Jo Lewis PA  03/03/25 4330

## 2025-02-27 NOTE — DISCHARGE INSTRUCTIONS
Thank You!    It was a pleasure taking care of you in our Emergency Department today. We know that when you come to [facility], you are entrusting us with your health, comfort, and safety. Our clinicians honor that trust, and truly appreciate the opportunity to care for you and your loved ones.    If you receive a survey about your Emergency Department experience today, please fill it out.  We value your feedback. Thank you.      Jo Lewis PA-C    ___________________________________  I have included a copy of your lab results and/or radiologic studies from today's visit so you can have them easily available at your follow-up visit.   Recent Results (from the past 12 hour(s))   COVID-19 & Influenza Combo    Collection Time: 02/26/25  7:17 PM    Specimen: Nasopharyngeal   Result Value Ref Range    Source Nasopharyngeal      SARS-CoV-2, PCR Not detected NOTD      Rapid Influenza A By PCR Detected (A) NOTD      Rapid Influenza B By PCR Not detected NOTD         No orders to display     [unfilled]

## 2025-02-27 NOTE — ED NOTES
Patient discharged from the ED by Debbie CHANG. Diagnosis, medications, precautions and follow-ups were reviewed with the patient/family. Questions were asked and answered prior to departure. Patient departed the ED via ambulatory and was accompanied by self.  Provider discharged patient.

## 2025-04-09 DIAGNOSIS — G93.2 IIH (IDIOPATHIC INTRACRANIAL HYPERTENSION): Primary | ICD-10-CM

## 2025-04-09 RX ORDER — ACETAZOLAMIDE 500 MG/1
500 CAPSULE, EXTENDED RELEASE ORAL 2 TIMES DAILY
Qty: 180 CAPSULE | Refills: 3 | Status: SHIPPED | OUTPATIENT
Start: 2025-04-09

## 2025-04-09 RX ORDER — IBUPROFEN 400 MG/1
400 TABLET, FILM COATED ORAL 2 TIMES DAILY PRN
Qty: 60 TABLET | Refills: 1 | Status: SHIPPED | OUTPATIENT
Start: 2025-04-09

## 2025-04-14 RX ORDER — ACETAZOLAMIDE 250 MG/1
500 TABLET ORAL 2 TIMES DAILY
Qty: 60 TABLET | Refills: 3 | Status: SHIPPED | OUTPATIENT
Start: 2025-04-14

## 2025-04-15 ENCOUNTER — OFFICE VISIT (OUTPATIENT)
Age: 25
End: 2025-04-15
Payer: MEDICAID

## 2025-04-15 VITALS
HEART RATE: 82 BPM | WEIGHT: 196 LBS | OXYGEN SATURATION: 99 % | SYSTOLIC BLOOD PRESSURE: 138 MMHG | DIASTOLIC BLOOD PRESSURE: 86 MMHG | HEIGHT: 60 IN | BODY MASS INDEX: 38.48 KG/M2

## 2025-04-15 DIAGNOSIS — G43.709 CHRONIC MIGRAINE WITHOUT AURA, NOT INTRACTABLE, WITHOUT STATUS MIGRAINOSUS: ICD-10-CM

## 2025-04-15 DIAGNOSIS — G93.2 IIH (IDIOPATHIC INTRACRANIAL HYPERTENSION): Primary | ICD-10-CM

## 2025-04-15 PROCEDURE — 99214 OFFICE O/P EST MOD 30 MIN: CPT | Performed by: PSYCHIATRY & NEUROLOGY

## 2025-04-15 RX ORDER — PANTOPRAZOLE SODIUM 20 MG/1
TABLET, DELAYED RELEASE ORAL
COMMUNITY
Start: 2024-07-25 | End: 2025-04-15

## 2025-04-15 RX ORDER — BUTALBITAL, ACETAMINOPHEN AND CAFFEINE 50; 325; 40 MG/1; MG/1; MG/1
TABLET ORAL
COMMUNITY
Start: 2025-01-16

## 2025-04-15 NOTE — PROGRESS NOTES
Chief Complaint   Patient presents with    Neurologic Problem     IIH     Vitals:    04/15/25 1405   BP: 138/86   Pulse: 82   SpO2: 99%       
TABLETS BY MOUTH EVERY 4 HOURS AS NEEDED FOR HEADACHE    acetaZOLAMIDE (DIAMOX) 250 MG tablet Take 2 tablets by mouth 2 times daily    ibuprofen (IBU) 400 MG tablet Take 1 tablet by mouth 2 times daily as needed for Pain or Fever    acetaminophen (AMINOFEN) 325 MG tablet Take 2 tablets by mouth every 6 hours as needed for Pain or Fever    ondansetron (ZOFRAN-ODT) 4 MG disintegrating tablet Take 1 tablet by mouth 3 times daily as needed for Nausea or Vomiting    topiramate (TOPAMAX) 50 MG tablet Take 1 tablet by mouth 2 times daily May increase to 100 mg BID after 1 wk     No current facility-administered medications for this visit.     PHYSICAL EXAMINATION:    Vitals:    04/15/25 1405   BP: 138/86   Pulse: 82   SpO2: 99%     NEUROLOGICAL EXAMINATION:     Mental Status:   Alert and oriented to person, place, and time.  Attention span and concentration are normal. Speech is fluent.      Cranial Nerves:    II, III, IV, VI:  Visual acuity grossly intact. Visual fields are normal.    Pupils are equal, round, and reactive to light.    Extra-ocular movements are full and fluid.  Fundoscopic exam showed mild papilledema on the right and none on the left .   V-XII: Hearing is grossly intact.  Facial features are symmetric, with normal sensation and strength.  The palate rises symmetrically and the tongue protrudes midline.  Sternocleidomastoids 5/5.      Motor Examination: Normal tone, bulk, and strength. 5/5 muscle strength throughout.  No cogwheel rigidity or clonus present.      Sensory exam:  Normal throughout to pinprick, temperature, and vibration sense.  Normal proprioception.      Coordination:  Finger to nose and rapid arm movement testing was normal.   No resting or intention tremor    Gait and Station:  Steady  Normal arm swing.  No Rhomberg or pronator drift.   No muscle wasting or fasiculations noted.      Reflexes:  DTRs 2+ throughout.  Toes downgoing.        LABS / IMAGING  MRI Result (most recent):  MRI

## 2025-06-03 RX ORDER — ACETAZOLAMIDE 250 MG/1
500 TABLET ORAL 2 TIMES DAILY
Qty: 60 TABLET | Refills: 3 | Status: SHIPPED | OUTPATIENT
Start: 2025-06-03

## 2025-07-30 ENCOUNTER — TELEPHONE (OUTPATIENT)
Age: 25
End: 2025-07-30

## 2025-07-30 NOTE — TELEPHONE ENCOUNTER
Patient has been sent the link for our pre-recorded [facility] Medical Weight Loss Orientation. Patient is required to register, view the recording, call insurance to verify benefits, then send an email to the  to schedule a consultation.

## 2025-07-31 ENCOUNTER — TELEPHONE (OUTPATIENT)
Age: 25
End: 2025-07-31

## 2025-07-31 NOTE — TELEPHONE ENCOUNTER
Called patient regarding referral to our [facility]. Patient did not answer so I left a voicemail with our contact information.     Verified Results  (1) HEMOGLOBIN A1C [id number] 12:00AM Missael Duncan     Test Name Result Flag Reference   Hemoglobin A1c 7 3 % H 4 8-5 6   Pre-diabetes: 5 7 - 6 4           Diabetes: >6 4           Glycemic control for adults with diabetes: <7 0

## 2025-08-05 DIAGNOSIS — G43.709 CHRONIC MIGRAINE WITHOUT AURA, NOT INTRACTABLE, WITHOUT STATUS MIGRAINOSUS: ICD-10-CM

## 2025-08-05 DIAGNOSIS — G93.2 IIH (IDIOPATHIC INTRACRANIAL HYPERTENSION): Primary | ICD-10-CM

## 2025-08-05 DIAGNOSIS — G93.2 IIH (IDIOPATHIC INTRACRANIAL HYPERTENSION): ICD-10-CM

## (undated) DEVICE — CATHETER IV 22GA L1IN OD0.8382-0.9144MM ID0.6096-0.6858MM 382523

## (undated) DEVICE — TIP SUCT TRNSPAR RIB SURF STD BLB RIG NVENT W/ 5IN1 CONN DYND50138] MEDLINE INDUSTRIES INC]

## (undated) DEVICE — CATHETER IV 20GA L1.16IN OD1.0414-1.1176MM ID0.762-0.8382MM

## (undated) DEVICE — ENDOSCOPIC KIT COMPLIANCE ENDOKIT

## (undated) DEVICE — IV START KIT: Brand: MEDLINE

## (undated) DEVICE — FORCEPS BX L240CM JAW DIA2.4MM ORNG L CAP W/ NDL DISP RAD

## (undated) DEVICE — CATHETER IV 18GA L1.16IN OD1.27-1.3462MM ID0.9398-1.016MM

## (undated) DEVICE — TRAP ENDOSCP POLYP 2 CHMBR DRAWER TYP

## (undated) DEVICE — CUFF BLD PRSS AD CLTH SGL TB W/ BAYNT CONN ROUNDED CORNER

## (undated) DEVICE — CONTAINER SPEC 20 ML LID NEUT BUFF FORMALIN 10 % POLYPR STS

## (undated) DEVICE — COVIDIEN KENDALL DL DISPOSABLE 3 LEAD SY: Brand: MEDLINE RENEWAL

## (undated) DEVICE — SET GRAV CK VLV NEEDLESS ST 3 GANGED 4WAY STPCOCK HI FLO 10

## (undated) DEVICE — ELECTRODE PT RET AD L9FT HI MOIST COND ADH HYDRGEL CORDED

## (undated) DEVICE — CATHETER IV 24GA L0.75IN OD0.6604-0.7366MM